# Patient Record
Sex: FEMALE | Race: WHITE | NOT HISPANIC OR LATINO | Employment: OTHER
[De-identification: names, ages, dates, MRNs, and addresses within clinical notes are randomized per-mention and may not be internally consistent; named-entity substitution may affect disease eponyms.]

---

## 2019-04-18 PROCEDURE — 88305 TISSUE EXAM BY PATHOLOGIST: CPT | Performed by: INTERNAL MEDICINE

## 2019-05-23 PROCEDURE — 82784 ASSAY IGA/IGD/IGG/IGM EACH: CPT | Performed by: INTERNAL MEDICINE

## 2019-07-09 PROBLEM — K58.0 IRRITABLE BOWEL SYNDROME WITH DIARRHEA: Status: ACTIVE | Noted: 2019-07-09

## 2021-08-23 PROBLEM — R73.09 ELEVATED GLUCOSE: Status: ACTIVE | Noted: 2021-08-23

## 2021-08-23 PROBLEM — Z28.21 COVID-19 VACCINATION REFUSED: Status: ACTIVE | Noted: 2021-08-23

## 2021-08-23 PROBLEM — Z28.21 PNEUMOCOCCAL VACCINE REFUSED: Status: ACTIVE | Noted: 2021-08-23

## 2021-08-23 PROBLEM — Z85.850 HISTORY OF THYROID CANCER: Status: ACTIVE | Noted: 2021-08-23

## 2022-01-04 ENCOUNTER — EXTERNAL RECORD (OUTPATIENT)
Dept: HEALTH INFORMATION MANAGEMENT | Facility: OTHER | Age: 67
End: 2022-01-04

## 2022-03-21 ENCOUNTER — HOSPITAL ENCOUNTER (EMERGENCY)
Facility: HOSPITAL | Age: 67
Discharge: HOME | End: 2022-03-21
Attending: EMERGENCY MEDICINE
Payer: MEDICARE

## 2022-03-21 ENCOUNTER — APPOINTMENT (OUTPATIENT)
Dept: GENERAL RADIOLOGY | Facility: HOSPITAL | Age: 67
End: 2022-03-21
Attending: EMERGENCY MEDICINE
Payer: MEDICARE

## 2022-03-21 VITALS
RESPIRATION RATE: 20 BRPM | DIASTOLIC BLOOD PRESSURE: 65 MMHG | OXYGEN SATURATION: 100 % | SYSTOLIC BLOOD PRESSURE: 128 MMHG | BODY MASS INDEX: 18 KG/M2 | HEART RATE: 63 BPM | WEIGHT: 115 LBS | TEMPERATURE: 98 F

## 2022-03-21 DIAGNOSIS — N76.0 ACUTE VAGINITIS: ICD-10-CM

## 2022-03-21 DIAGNOSIS — E86.0 DEHYDRATION: Primary | ICD-10-CM

## 2022-03-21 LAB
ALBUMIN SERPL-MCNC: 3.6 G/DL (ref 3.4–5)
ALP LIVER SERPL-CCNC: 49 U/L
ALT SERPL-CCNC: 31 U/L
ANION GAP SERPL CALC-SCNC: 6 MMOL/L (ref 0–18)
AST SERPL-CCNC: 24 U/L (ref 15–37)
BASOPHILS # BLD AUTO: 0.05 X10(3) UL (ref 0–0.2)
BASOPHILS NFR BLD AUTO: 0.8 %
BILIRUB DIRECT SERPL-MCNC: <0.1 MG/DL (ref 0–0.2)
BILIRUB SERPL-MCNC: 0.3 MG/DL (ref 0.1–2)
BILIRUB UR QL: NEGATIVE
BUN BLD-MCNC: 20 MG/DL (ref 7–18)
BUN/CREAT SERPL: 16.3 (ref 10–20)
CALCIUM BLD-MCNC: 8.5 MG/DL (ref 8.5–10.1)
CHLORIDE SERPL-SCNC: 105 MMOL/L (ref 98–112)
CO2 SERPL-SCNC: 26 MMOL/L (ref 21–32)
COLOR UR: YELLOW
CREAT BLD-MCNC: 1.23 MG/DL
DEPRECATED RDW RBC AUTO: 43 FL (ref 35.1–46.3)
EOSINOPHIL # BLD AUTO: 0.14 X10(3) UL (ref 0–0.7)
EOSINOPHIL NFR BLD AUTO: 2.2 %
ERYTHROCYTE [DISTWIDTH] IN BLOOD BY AUTOMATED COUNT: 12 % (ref 11–15)
GLUCOSE BLD-MCNC: 99 MG/DL (ref 70–99)
GLUCOSE UR-MCNC: NEGATIVE MG/DL
HCT VFR BLD AUTO: 39.1 %
HGB BLD-MCNC: 12.5 G/DL
HGB UR QL STRIP.AUTO: NEGATIVE
IMM GRANULOCYTES # BLD AUTO: 0.01 X10(3) UL (ref 0–1)
KETONES UR-MCNC: NEGATIVE MG/DL
LEUKOCYTE ESTERASE UR QL STRIP.AUTO: NEGATIVE
LYMPHOCYTES # BLD AUTO: 1.35 X10(3) UL (ref 1–4)
LYMPHOCYTES NFR BLD AUTO: 21.2 %
MCH RBC QN AUTO: 31.3 PG (ref 26–34)
MCHC RBC AUTO-ENTMCNC: 32 G/DL (ref 31–37)
MCV RBC AUTO: 97.8 FL
MONOCYTES # BLD AUTO: 0.47 X10(3) UL (ref 0.1–1)
MONOCYTES NFR BLD AUTO: 7.4 %
NEUTROPHILS # BLD AUTO: 4.36 X10 (3) UL (ref 1.5–7.7)
NEUTROPHILS # BLD AUTO: 4.36 X10(3) UL (ref 1.5–7.7)
NEUTROPHILS NFR BLD AUTO: 68.2 %
NITRITE UR QL STRIP.AUTO: NEGATIVE
OSMOLALITY SERPL CALC.SUM OF ELEC: 287 MOSM/KG (ref 275–295)
PH UR: 7 [PH] (ref 5–8)
PLATELET # BLD AUTO: 295 10(3)UL (ref 150–450)
POTASSIUM SERPL-SCNC: 4.2 MMOL/L (ref 3.5–5.1)
PROT SERPL-MCNC: 6.7 G/DL (ref 6.4–8.2)
PROT UR-MCNC: NEGATIVE MG/DL
RBC # BLD AUTO: 4 X10(6)UL
SODIUM SERPL-SCNC: 137 MMOL/L (ref 136–145)
SP GR UR STRIP: 1.02 (ref 1–1.03)
TSI SER-ACNC: 0.92 MIU/ML (ref 0.36–3.74)
UROBILINOGEN UR STRIP-ACNC: <2
VIT C UR-MCNC: 40 MG/DL
WBC # BLD AUTO: 6.4 X10(3) UL (ref 4–11)

## 2022-03-21 PROCEDURE — 84443 ASSAY THYROID STIM HORMONE: CPT | Performed by: EMERGENCY MEDICINE

## 2022-03-21 PROCEDURE — 96360 HYDRATION IV INFUSION INIT: CPT

## 2022-03-21 PROCEDURE — 71045 X-RAY EXAM CHEST 1 VIEW: CPT | Performed by: EMERGENCY MEDICINE

## 2022-03-21 PROCEDURE — 80048 BASIC METABOLIC PNL TOTAL CA: CPT | Performed by: EMERGENCY MEDICINE

## 2022-03-21 PROCEDURE — 80076 HEPATIC FUNCTION PANEL: CPT | Performed by: EMERGENCY MEDICINE

## 2022-03-21 PROCEDURE — 99284 EMERGENCY DEPT VISIT MOD MDM: CPT

## 2022-03-21 PROCEDURE — 85025 COMPLETE CBC W/AUTO DIFF WBC: CPT | Performed by: EMERGENCY MEDICINE

## 2022-03-21 PROCEDURE — 81001 URINALYSIS AUTO W/SCOPE: CPT | Performed by: EMERGENCY MEDICINE

## 2022-03-21 RX ORDER — SODIUM CHLORIDE 9 MG/ML
INJECTION, SOLUTION INTRAVENOUS CONTINUOUS
Status: DISCONTINUED | OUTPATIENT
Start: 2022-03-21 | End: 2022-03-21

## 2022-03-21 RX ORDER — FLUCONAZOLE 150 MG/1
150 TABLET ORAL
Qty: 2 TABLET | Refills: 0 | Status: SHIPPED | OUTPATIENT
Start: 2022-03-21 | End: 2022-03-29

## 2022-03-21 NOTE — ED QUICK NOTES
Pt states has been weak, chills, hot flashes since UTI. States finished antibiotic yesterday, continues to have symptoms.

## 2022-03-21 NOTE — ED INITIAL ASSESSMENT (HPI)
Pt to ED for continued weakness, labored breathing with activity, nausea. Recent UTI and finished bactrim last night. RR even and unlabored in triage. Referred to ED for continued symptoms.

## 2023-01-19 ENCOUNTER — EXTERNAL RECORD (OUTPATIENT)
Dept: HEALTH INFORMATION MANAGEMENT | Facility: OTHER | Age: 68
End: 2023-01-19

## 2023-04-23 PROBLEM — F41.9 ANXIETY: Status: ACTIVE | Noted: 2023-04-23

## 2023-04-23 PROBLEM — N71.9: Status: ACTIVE | Noted: 2023-04-23

## 2023-04-23 PROBLEM — K58.9 IBS (IRRITABLE BOWEL SYNDROME): Status: ACTIVE | Noted: 2023-04-23

## 2023-04-23 PROBLEM — N85.8 ATROPHIC ENDOMETRIUM: Status: ACTIVE | Noted: 2023-04-23

## 2023-04-23 PROBLEM — Z85.850 HISTORY OF THYROID CANCER: Status: ACTIVE | Noted: 2023-04-23

## 2023-05-02 ENCOUNTER — OFFICE VISIT (OUTPATIENT)
Dept: OBGYN | Age: 68
End: 2023-05-02

## 2023-05-02 ENCOUNTER — TELEPHONE (OUTPATIENT)
Dept: OBGYN | Age: 68
End: 2023-05-02

## 2023-05-02 VITALS
HEART RATE: 68 BPM | BODY MASS INDEX: 16.31 KG/M2 | HEIGHT: 68 IN | SYSTOLIC BLOOD PRESSURE: 114 MMHG | TEMPERATURE: 98.1 F | DIASTOLIC BLOOD PRESSURE: 73 MMHG | WEIGHT: 107.58 LBS | OXYGEN SATURATION: 99 %

## 2023-05-02 DIAGNOSIS — F41.9 ANXIETY: ICD-10-CM

## 2023-05-02 DIAGNOSIS — N85.8 ATROPHIC ENDOMETRIUM: Primary | ICD-10-CM

## 2023-05-02 PROCEDURE — 99214 OFFICE O/P EST MOD 30 MIN: CPT | Performed by: OBSTETRICS & GYNECOLOGY

## 2023-05-02 RX ORDER — LEVOTHYROXINE SODIUM 50 UG/1
TABLET ORAL
COMMUNITY
Start: 2023-04-11

## 2023-05-02 ASSESSMENT — PATIENT HEALTH QUESTIONNAIRE - PHQ9
CLINICAL INTERPRETATION OF PHQ2 SCORE: NO FURTHER SCREENING NEEDED
2. FEELING DOWN, DEPRESSED OR HOPELESS: NOT AT ALL
1. LITTLE INTEREST OR PLEASURE IN DOING THINGS: NOT AT ALL
SUM OF ALL RESPONSES TO PHQ9 QUESTIONS 1 AND 2: 0
SUM OF ALL RESPONSES TO PHQ9 QUESTIONS 1 AND 2: 0

## 2023-05-24 ENCOUNTER — TELEPHONE (OUTPATIENT)
Dept: OBGYN | Age: 68
End: 2023-05-24

## 2023-08-01 ENCOUNTER — OFFICE VISIT (OUTPATIENT)
Dept: OTOLARYNGOLOGY | Facility: CLINIC | Age: 68
End: 2023-08-01

## 2023-08-01 VITALS — WEIGHT: 113 LBS | BODY MASS INDEX: 17.74 KG/M2 | HEIGHT: 67 IN

## 2023-08-01 DIAGNOSIS — H61.23 CERUMEN DEBRIS ON TYMPANIC MEMBRANE OF BOTH EARS: Primary | ICD-10-CM

## 2023-08-01 PROCEDURE — 69210 REMOVE IMPACTED EAR WAX UNI: CPT | Performed by: SPECIALIST

## 2023-08-01 NOTE — PROGRESS NOTES
Ears = bilateral cerumen occlussions. Fully cleaned under microscope using instrumentation and suctioning. Normal tympanic membranes. Follow-up in 1 year's time, sooner if problems.

## 2024-02-23 ENCOUNTER — OFFICE VISIT (OUTPATIENT)
Dept: OTOLARYNGOLOGY | Facility: CLINIC | Age: 69
End: 2024-02-23

## 2024-02-23 VITALS — WEIGHT: 115 LBS | HEIGHT: 67 IN | BODY MASS INDEX: 18.05 KG/M2

## 2024-02-23 DIAGNOSIS — R60.0 FACIAL EDEMA: ICD-10-CM

## 2024-02-23 DIAGNOSIS — H92.01 REFERRED OTALGIA OF RIGHT EAR: Primary | ICD-10-CM

## 2024-02-23 PROCEDURE — 99213 OFFICE O/P EST LOW 20 MIN: CPT | Performed by: SPECIALIST

## 2024-02-23 RX ORDER — OMEPRAZOLE 40 MG/1
40 CAPSULE, DELAYED RELEASE ORAL DAILY
Qty: 30 CAPSULE | Refills: 2 | Status: SHIPPED | OUTPATIENT
Start: 2024-02-23

## 2024-02-23 RX ORDER — AMOXICILLIN AND CLAVULANATE POTASSIUM 875; 125 MG/1; MG/1
1 TABLET, FILM COATED ORAL EVERY 12 HOURS
Qty: 20 TABLET | Refills: 0 | Status: SHIPPED | OUTPATIENT
Start: 2024-02-23

## 2024-02-24 NOTE — PROGRESS NOTES
Leonela Faria is a 68 year old female.   Chief Complaint   Patient presents with    Ear Problem     Ear pain     HPI:   Patient with swelling of her right face.  Also right otalgia.  Has difficulty chewing.    Current Outpatient Medications   Medication Sig Dispense Refill    amoxicillin clavulanate 875-125 MG Oral Tab Take 1 tablet by mouth every 12 (twelve) hours. 20 tablet 0    Omeprazole 40 MG Oral Capsule Delayed Release Take 1 capsule (40 mg total) by mouth daily. Before a meal 30 capsule 2    hydrocortisone 2.5 % External Ointment Apply 1 Application  topically daily. 30 g 3    sulfamethoxazole-trimethoprim -160 MG Oral Tab per tablet Take 1 tablet by mouth 2 (two) times daily. 14 tablet 0    UNITHROID 50 MCG Oral Tab Take 1 tablet (50 mcg total) by mouth before breakfast. 90 tablet 0    PATIENT SUPPLIED MEDICATION orth molecular products - pyloricil BID      Multiple Vitamins-Minerals (MULTI-VITAMIN/MINERALS) Oral Tab Take 1 tablet by mouth daily.      Thiamine HCl (VITAMIN B-1) 50 MG Oral Tab Take 1 tablet (50 mg total) by mouth daily.      Vitamin C 500 MG Oral Tab Take 1 tablet (500 mg total) by mouth daily.      LUTEIN-ZEAXANTHIN OR Take 1 capsule by mouth every morning.      Bilberry, Vaccinium myrtillus, (BILBERRY OR) Take 1 capsule by mouth every morning.      BIOTIN OR Take 1 capsule by mouth every morning.      Cholecalciferol (VITAMIN D3) 1000 units Oral Cap Take 1 tablet by mouth 2 (two) times daily.      Coenzyme Q10 (COQ10) 30 MG Oral Cap Take 1 capsule by mouth 2 (two) times daily.      Calcium 500-125 MG-UNIT Oral Tab Take by mouth.      Boswellia Ivone (BOSWELLIA OR) Take 2 capsules by mouth every evening.      POTASSIUM OR Take 1 tablet by mouth every evening.        Past Medical History:   Diagnosis Date    Anxiety     IBS (irritable bowel syndrome)       Social History:  Social History     Socioeconomic History    Marital status:    Tobacco Use    Smoking status: Never     Smokeless tobacco: Never   Vaping Use    Vaping Use: Never used   Substance and Sexual Activity    Drug use: Never        REVIEW OF SYSTEMS:   GENERAL HEALTH: feels well otherwise  GENERAL : denies fever, chills, sweats, weight loss, weight gain  SKIN: denies any unusual skin lesions or rashes  RESPIRATORY: denies shortness of breath with exertion  NEURO: denies headaches    EXAM:   Ht 5' 7\" (1.702 m)   Wt 115 lb (52.2 kg)   BMI 18.01 kg/m²   System Details   Skin Inspection - Normal.   Constitutional Overall appearance - Normal.   Head/Face Facial features - Normal. Eyebrows - Normal. Skull - Normal.   Eyes Conjunctiva - Right: Normal, Left: Normal. Pupil - Right: Normal, Left: Normal.    Ears Inspection - Right: Normal, Left: Normal.   Canal - Right: Normal, Left: Normal.   TM - Right: Normal, Left: Normal.  No middle ear fluid either ear   Nasal External nose - Normal.   Nasal septum - Normal.  Turbinates - Normal.   Oral/Oropharynx Lips - Normal, Tonsils - Normal, Tongue - Normal.  Some swelling anterior to the right parotid gland and along the inferior border of the mandible.  No obvious dental infection noted.  Pain with palpation of the condylar head as well as biting on tongue blades on the right.   Neck Exam Inspection - Normal. Palpation - Normal. Parotid gland - Normal. Thyroid gland - Normal.   Lymph Detail Submental. Submandibular. Anterior cervical. Posterior cervical. Supraclavicular all without enlargement   Psychiatric Orientation - Oriented to time, place, person & situation. Appropriate mood and affect.   Neurological Memory - Normal. Cranial nerves - Cranial nerves II through XII grossly intact.     ASSESSMENT AND PLAN:   1. Referred otalgia of right ear  Negative otologic exam    2. Facial edema  Suspect dental origin.  Patient placed on a trial of Augmentin.  I asked her to see her dentist for possible dental films.  Patient to go to the emergency room should her symptoms worsen    The  patient indicates understanding of these issues and agrees to the plan.      Melinda Jaramillo MD  2/23/2024  10:17 PM

## 2024-02-24 NOTE — PATIENT INSTRUCTIONS
He had facial swelling along the inferior border of the mandible.  I believe this is dental in origin.  I believe your right otalgia is a referred pain as there was no evidence of ear infection on the day to your visit.  I placed you on Augmentin and asked you to see your dentist.  Go to the emergency room should your symptoms worsen.

## 2024-03-21 ENCOUNTER — TELEPHONE (OUTPATIENT)
Dept: OBGYN | Age: 69
End: 2024-03-21

## 2024-03-27 ENCOUNTER — TELEPHONE (OUTPATIENT)
Dept: OTOLARYNGOLOGY | Facility: CLINIC | Age: 69
End: 2024-03-27

## 2024-03-27 DIAGNOSIS — R22.1 NECK MASS: Primary | ICD-10-CM

## 2024-03-27 RX ORDER — AZITHROMYCIN 250 MG/1
TABLET, FILM COATED ORAL
Qty: 6 TABLET | Refills: 0 | Status: SHIPPED | OUTPATIENT
Start: 2024-03-27

## 2024-03-27 NOTE — TELEPHONE ENCOUNTER
Patient having symptoms and has an affected lump on her neck that is worsening. Patient has appointment scheduled tomorrow and asking if she can be seen today. Patient also indicates a fax was sent to the office from [facility]ates,   Please is not home right now, so request to call on mobile number [phone], thanks.

## 2024-03-27 NOTE — TELEPHONE ENCOUNTER
Patient on augmentin.  Had swelling over the thyroid.  Consider thyroglossal duct cyst.  Also intermittent swelling of the right parotid gland.

## 2024-03-27 NOTE — TELEPHONE ENCOUNTER
Spoke with  patient stated her thyroid Dr. So Massey mentioned she see Dr. Jaramillo for lump on throat. Patient is scheduled.   Called thyroid doctor office to have notes from today visit sent to our office. [phone]. Fax #[phone]. LMTCB and to fax over notes from visit.   Future Appointments   Date Time Provider Department Center   3/28/2024  5:20 PM Melinda Jaramillo MD [facility]   5/15/2024 10:30 AM Tavo José MD [facility]   7/30/2024  8:30 AM Melinda Jar[facility]illo MD [facility]

## 2024-03-28 ENCOUNTER — TELEPHONE (OUTPATIENT)
Dept: OTOLARYNGOLOGY | Facility: CLINIC | Age: 69
End: 2024-03-28

## 2024-03-28 ENCOUNTER — OFFICE VISIT (OUTPATIENT)
Dept: OTOLARYNGOLOGY | Facility: CLINIC | Age: 69
End: 2024-03-28

## 2024-03-28 ENCOUNTER — HOSPITAL ENCOUNTER (OUTPATIENT)
Dept: CT IMAGING | Facility: HOSPITAL | Age: 69
Discharge: HOME | End: 2024-03-28
Attending: SPECIALIST
Payer: MEDICARE

## 2024-03-28 VITALS — BODY MASS INDEX: 18.05 KG/M2 | WEIGHT: 115 LBS | HEIGHT: 67 IN

## 2024-03-28 DIAGNOSIS — Q89.2 THYROGLOSSAL DUCT CYST: ICD-10-CM

## 2024-03-28 DIAGNOSIS — R22.1 NECK MASS: Primary | ICD-10-CM

## 2024-03-28 DIAGNOSIS — R22.1 NECK MASS: ICD-10-CM

## 2024-03-28 DIAGNOSIS — E04.1 THYROID NODULE: ICD-10-CM

## 2024-03-28 DIAGNOSIS — Z85.850 HISTORY OF THYROID CANCER: ICD-10-CM

## 2024-03-28 LAB
CREAT BLD-MCNC: 1.5 MG/DL
EGFRCR SERPLBLD CKD-EPI 2021: 37 ML/MIN/1.73M2 (ref 60–?)

## 2024-03-28 PROCEDURE — 99214 OFFICE O/P EST MOD 30 MIN: CPT | Performed by: SPECIALIST

## 2024-03-28 PROCEDURE — 82565 ASSAY OF CREATININE: CPT

## 2024-03-28 PROCEDURE — 70491 CT SOFT TISSUE NECK W/DYE: CPT | Performed by: SPECIALIST

## 2024-03-28 RX ORDER — FLUCONAZOLE 100 MG/1
100 TABLET ORAL DAILY
Qty: 1 TABLET | Refills: 0 | Status: SHIPPED | OUTPATIENT
Start: 2024-03-28

## 2024-03-28 RX ORDER — CLINDAMYCIN HYDROCHLORIDE 300 MG/1
300 CAPSULE ORAL EVERY 8 HOURS
Qty: 30 CAPSULE | Refills: 0 | Status: SHIPPED | OUTPATIENT
Start: 2024-03-28 | End: 2024-04-07

## 2024-03-28 NOTE — TELEPHONE ENCOUNTER
Patient was advised that Ct of neck would cover the parotid gland. If additional scans are needed then it can be discussed during appointment today with Dr. Jaramillo. Patient verbalized understanding.

## 2024-03-28 NOTE — TELEPHONE ENCOUNTER
Patient called regarding how she only received referral for CT scan, would she also be having anything done for her Parotid gland?  Patient requested if nurse can callback? Patient as appointment for CT scan at 1 pm today at [facility].

## 2024-03-29 ENCOUNTER — TELEPHONE (OUTPATIENT)
Dept: OTOLARYNGOLOGY | Facility: CLINIC | Age: 69
End: 2024-03-29

## 2024-03-29 NOTE — PATIENT INSTRUCTIONS
You have an infected thyroglossal duct cyst.  I placed you on a trial of clindamycin.  I also sent you in a prescription of Diflucan if you have a vaginal yeast infection that helped by the Lotrimin cream.  Follow-up in 2 to 3 weeks time, sooner if problems.  We can consider excision once the infection is clear.

## 2024-03-29 NOTE — PROGRESS NOTES
Leonela Faria is a 68 year old female.   Chief Complaint   Patient presents with    Follow - Up     referred otalgia of right ear    Results     Discuss CT scan results     HPI:   Here for a checkup on her CT scan results.    Current Outpatient Medications   Medication Sig Dispense Refill    clindamycin 300 MG Oral Cap Take 1 capsule (300 mg total) by mouth every 8 (eight) hours for 10 days. 30 capsule 0    fluconazole 100 MG Oral Tab Take 1 tablet (100 mg total) by mouth daily. 1 tablet 0    azithromycin (ZITHROMAX Z-LILIANA) 250 MG Oral Tab Take 1 by oral route every day for 5 days. 2 tablets today. 6 tablet 0    amoxicillin clavulanate 875-125 MG Oral Tab Take 1 tablet by mouth every 12 (twelve) hours. 20 tablet 0    Omeprazole 40 MG Oral Capsule Delayed Release Take 1 capsule (40 mg total) by mouth daily. Before a meal 30 capsule 2    hydrocortisone 2.5 % External Ointment Apply 1 Application  topically daily. 30 g 3    sulfamethoxazole-trimethoprim -160 MG Oral Tab per tablet Take 1 tablet by mouth 2 (two) times daily. 14 tablet 0    UNITHROID 50 MCG Oral Tab Take 1 tablet (50 mcg total) by mouth before breakfast. 90 tablet 0    PATIENT SUPPLIED MEDICATION orth molecular products - pyloricil BID      Multiple Vitamins-Minerals (MULTI-VITAMIN/MINERALS) Oral Tab Take 1 tablet by mouth daily.      Thiamine HCl (VITAMIN B-1) 50 MG Oral Tab Take 1 tablet (50 mg total) by mouth daily.      Vitamin C 500 MG Oral Tab Take 1 tablet (500 mg total) by mouth daily.      LUTEIN-ZEAXANTHIN OR Take 1 capsule by mouth every morning.      Bilberry, Vaccinium myrtillus, (BILBERRY OR) Take 1 capsule by mouth every morning.      BIOTIN OR Take 1 capsule by mouth every morning.      Cholecalciferol (VITAMIN D3) 1000 units Oral Cap Take 1 tablet by mouth 2 (two) times daily.      Coenzyme Q10 (COQ10) 30 MG Oral Cap Take 1 capsule by mouth 2 (two) times daily.      Calcium 500-125 MG-UNIT Oral Tab Take by mouth.      Boswellia  Ivone (BOSWELLIA OR) Take 2 capsules by mouth every evening.      POTASSIUM OR Take 1 tablet by mouth every evening.        Past Medical History:   Diagnosis Date    Anxiety     IBS (irritable bowel syndrome)       Social History:  Social History     Socioeconomic History    Marital status:    Tobacco Use    Smoking status: Never    Smokeless tobacco: Never   Vaping Use    Vaping Use: Never used   Substance and Sexual Activity    Drug use: Never        REVIEW OF SYSTEMS:   GENERAL HEALTH: feels well otherwise  GENERAL : denies fever, chills, sweats, weight loss, weight gain  SKIN: denies any unusual skin lesions or rashes  RESPIRATORY: denies shortness of breath with exertion  NEURO: denies headaches    EXAM:   Ht 5' 7\" (1.702 m)   Wt 115 lb (52.2 kg)   BMI 18.01 kg/m²   System Details   Skin Inspection - Normal.   Constitutional Overall appearance - Normal.   Head/Face Facial features - Normal. Eyebrows - Normal. Skull - Normal.   Eyes Conjunctiva - Right: Normal, Left: Normal. Pupil - Right: Normal, Left: Normal.    Ears Inspection - Right: Normal, Left: Normal.   Canal - Right: Normal, Left: Normal.   TM - Right: Normal, Left: Normal.   Nasal External nose - Normal.   Nasal septum - Normal.  Turbinates - Normal.   Oral/Oropharynx Lips - Normal, Tonsils - Normal, Tongue - Normal    Neck Exam Inspection - Normal. Palpation - Normal. Parotid gland -slight fullness around the inferior aspect of the parotid gland clear drainage through the bilateral parotid ducts thyroid gland - Normal.  Roundish mass around the level of the hyoid in the midline.  Tender to palpation.   Lymph Detail Submental. Submandibular. Anterior cervical. Posterior cervical. Supraclavicular all without enlargement   Psychiatric Orientation - Oriented to time, place, person & situation. Appropriate mood and affect.   Neurological Memory - Normal. Cranial nerves - Cranial nerves II through XII grossly intact.     ASSESSMENT AND PLAN:    1. Neck mass  CT scan reviewed including actual images with patient and  most consistent with infected thyroglossal duct cyst.    2. Thyroglossal duct cyst  Patient placed on trial of clindamycin.  Also given a prescription of Diflucan should she have a worsening of her vaginal yeast infection.  Follow-up in 2 to 3 weeks time.  Will discuss excision further at that point in time.    3. History of thyroid cancer  Absent right thyroid.  Small nodule in left thyroid.    4. Thyroid nodule  Seen on CT being followed by .      The patient indicates understanding of these issues and agrees to the plan.      Melinda Jaramillo MD  3/28/2024  11:18 PM

## 2024-03-29 NOTE — TELEPHONE ENCOUNTER
Patient calling stating she would like the 3 page report from her CT scan yesterday faxed over to her doctor; Dr. Massey. Dr. Massey's fax number is [phone]. Phone number is [phone]. Please advise.

## 2024-04-02 ENCOUNTER — TELEPHONE (OUTPATIENT)
Dept: OTOLARYNGOLOGY | Facility: CLINIC | Age: 69
End: 2024-04-02

## 2024-04-02 NOTE — TELEPHONE ENCOUNTER
Per pt states thyroglossal duct cyst doesn't seem to be going down, seems larger, pt states she has continued to take antibiotics, requesting to speak to RN, unsure if she needs to see Dr. Jaramillo. Please call thank you.

## 2024-04-02 NOTE — TELEPHONE ENCOUNTER
Per patient, thyroglossal cyst seems to be larger, circled red area is extending, has been having chills over weekend but did not take temperature, is concerned whether she should be seen again, if antibiotics are working.    Dr. Jaramillo, please see message and advise.  Thank you.

## 2024-04-03 ENCOUNTER — TELEPHONE (OUTPATIENT)
Dept: OTOLARYNGOLOGY | Facility: CLINIC | Age: 69
End: 2024-04-03

## 2024-04-03 ENCOUNTER — OFFICE VISIT (OUTPATIENT)
Dept: OTOLARYNGOLOGY | Facility: CLINIC | Age: 69
End: 2024-04-03

## 2024-04-03 DIAGNOSIS — K14.8 THYROGLOSSAL DUCT INFECTION: Primary | ICD-10-CM

## 2024-04-03 PROCEDURE — 99213 OFFICE O/P EST LOW 20 MIN: CPT | Performed by: SPECIALIST

## 2024-04-03 RX ORDER — AZITHROMYCIN 250 MG/1
TABLET, FILM COATED ORAL
Qty: 6 TABLET | Refills: 0 | Status: SHIPPED | OUTPATIENT
Start: 2024-04-03

## 2024-04-03 NOTE — TELEPHONE ENCOUNTER
Patient calling would like to know if she will need to stop  taking previous antibiotic clindamycin 300 MG Oral Cap in order to start the new antibiotic  azithromycin (ZITHROMAX Z-LILIANA) 250 MG Oral Tab    Please advise

## 2024-04-04 NOTE — TELEPHONE ENCOUNTER
Contacted patient and per Dr. Jaramillo, patient is to stop clindamycin and to continue zithromycin as directed.  Per patient, had some slight drainage from cultured thyroglossal duct cyst, so she was able to press some additional drainage from cyst by gently pressing on area. Instructed patient to NOT attempt to squeeze any additional drainage from cyst, and continue to take zithromycin as directed.  Discussed above with Dr. Jaramillo and md concurred with information given. Verbalized understanding.  Will call patient with culture results once received.

## 2024-04-04 NOTE — PROGRESS NOTES
Leonela Faria is a 68 year old female.   Chief Complaint   Patient presents with    Follow - Up     Neck mass has more swelling      HPI:   Here has been on Augmentin then clindamycin.  Increased erythema over the infected cyst.    Current Outpatient Medications   Medication Sig Dispense Refill    azithromycin (ZITHROMAX Z-LILIANA) 250 MG Oral Tab Take 1 by oral route every day for 5 days. 2 tablets today. 6 tablet 0    clindamycin 300 MG Oral Cap Take 1 capsule (300 mg total) by mouth every 8 (eight) hours for 10 days. 30 capsule 0    fluconazole 100 MG Oral Tab Take 1 tablet (100 mg total) by mouth daily. 1 tablet 0    azithromycin (ZITHROMAX Z-LILIANA) 250 MG Oral Tab Take 1 by oral route every day for 5 days. 2 tablets today. 6 tablet 0    amoxicillin clavulanate 875-125 MG Oral Tab Take 1 tablet by mouth every 12 (twelve) hours. 20 tablet 0    Omeprazole 40 MG Oral Capsule Delayed Release Take 1 capsule (40 mg total) by mouth daily. Before a meal 30 capsule 2    hydrocortisone 2.5 % External Ointment Apply 1 Application  topically daily. 30 g 3    sulfamethoxazole-trimethoprim -160 MG Oral Tab per tablet Take 1 tablet by mouth 2 (two) times daily. 14 tablet 0    UNITHROID 50 MCG Oral Tab Take 1 tablet (50 mcg total) by mouth before breakfast. 90 tablet 0    PATIENT SUPPLIED MEDICATION orth molecular products - pyloricil BID      Multiple Vitamins-Minerals (MULTI-VITAMIN/MINERALS) Oral Tab Take 1 tablet by mouth daily.      Thiamine HCl (VITAMIN B-1) 50 MG Oral Tab Take 1 tablet (50 mg total) by mouth daily.      Vitamin C 500 MG Oral Tab Take 1 tablet (500 mg total) by mouth daily.      LUTEIN-ZEAXANTHIN OR Take 1 capsule by mouth every morning.      Bilberry, Vaccinium myrtillus, (BILBERRY OR) Take 1 capsule by mouth every morning.      BIOTIN OR Take 1 capsule by mouth every morning.      Cholecalciferol (VITAMIN D3) 1000 units Oral Cap Take 1 tablet by mouth 2 (two) times daily.      Coenzyme Q10 (COQ10) 30  MG Oral Cap Take 1 capsule by mouth 2 (two) times daily.      Calcium 500-125 MG-UNIT Oral Tab Take by mouth.      Boswellia Ivone (BOSWELLIA OR) Take 2 capsules by mouth every evening.      POTASSIUM OR Take 1 tablet by mouth every evening.        Past Medical History:   Diagnosis Date    Anxiety     IBS (irritable bowel syndrome)       Social History:  Social History     Socioeconomic History    Marital status:    Tobacco Use    Smoking status: Never    Smokeless tobacco: Never   Vaping Use    Vaping Use: Never used   Substance and Sexual Activity    Drug use: Never        REVIEW OF SYSTEMS:   GENERAL HEALTH: feels well otherwise  GENERAL : denies fever, chills, sweats, weight loss, weight gain  SKIN: denies any unusual skin lesions or rashes  RESPIRATORY: denies shortness of breath with exertion  NEURO: denies headaches    EXAM:   There were no vitals taken for this visit.  System Details   Skin Inspection - Normal.   Constitutional Overall appearance - Normal.   Head/Face Facial features - Normal. Eyebrows - Normal. Skull - Normal.   Eyes Conjunctiva - Right: Normal, Left: Normal. Pupil - Right: Normal, Left: Normal.    Ears Inspection - Right: Normal, Left: Normal.      Nasal External nose - Normal.      Oral/Oropharynx Lips - Normal   Neck Exam Inspection - Normal. Palpation - Normal. Parotid gland - Normal. Thyroid gland - Normal.  Inflamed thyroglossal duct cyst.  Consent was obtained.  Area was cleaned with rubbing alcohol.  Area was anesthetized with 1% lidocaine with 100,000 epinephrine.  An 18-gauge needle was used to obtain some purulence.  This was sent for culture.   Lymph Detail Submental. Submandibular. Anterior cervical. Posterior cervical. Supraclavicular all without enlargement   Psychiatric Orientation - Oriented to time, place, person & situation. Appropriate mood and affect.   Neurological Memory - Normal. Cranial nerves - Cranial nerves II through XII grossly intact.     ASSESSMENT  AND PLAN:   1. Thyroglossal duct infection  Culture sent.  Patient placed on Zithromycin.  I will of course notify you her of all results.  - Aerobic Bacterial Culture; Future  - Anaerobic Culture; Future  - Anaerobic Culture  - Aerobic Bacterial Culture      The patient indicates understanding of these issues and agrees to the plan.      Melinda Jaramillo MD  4/3/2024  10:08 PM

## 2024-04-04 NOTE — PATIENT INSTRUCTIONS
Aspirate was taken of your infected thyroglossal duct cyst.  I placed you on Zithromycin until the culture comes back.  I will of course call you with the culture results.

## 2024-04-07 ENCOUNTER — TELEPHONE (OUTPATIENT)
Dept: OTOLARYNGOLOGY | Facility: CLINIC | Age: 69
End: 2024-04-07

## 2024-04-07 RX ORDER — AMOXICILLIN AND CLAVULANATE POTASSIUM 875; 125 MG/1; MG/1
1 TABLET, FILM COATED ORAL EVERY 12 HOURS
Qty: 20 TABLET | Refills: 0 | Status: SHIPPED | OUTPATIENT
Start: 2024-04-07

## 2024-04-08 ENCOUNTER — TELEPHONE (OUTPATIENT)
Dept: OTOLARYNGOLOGY | Facility: CLINIC | Age: 69
End: 2024-04-08

## 2024-04-08 ENCOUNTER — OFFICE VISIT (OUTPATIENT)
Dept: OTOLARYNGOLOGY | Facility: CLINIC | Age: 69
End: 2024-04-08

## 2024-04-08 VITALS — HEIGHT: 67 IN | BODY MASS INDEX: 18.05 KG/M2 | WEIGHT: 115 LBS

## 2024-04-08 DIAGNOSIS — K14.8 THYROGLOSSAL DUCT INFECTION: Primary | ICD-10-CM

## 2024-04-08 PROCEDURE — 99213 OFFICE O/P EST LOW 20 MIN: CPT | Performed by: SPECIALIST

## 2024-04-08 NOTE — TELEPHONE ENCOUNTER
I returned patient's call and advised her to use water and a mild soap to clean that cyst on her neck, Dr. Jaramillo agreed that would be fine.  Leonela agreeable to that plan of care and will call us if it gets worse.

## 2024-04-08 NOTE — PATIENT INSTRUCTIONS
The infection in your thyroglossal duct cyst looks improved.  The culture grew a Proteus mirabilis sensitive to the Augmentin.  As of now the anaerobic culture is negative.  Continue to dress the area with gauze until the drainage stops.  Okay to gently clean the area with soap and water.  Follow-up in about 1 week's time, sooner if problems.

## 2024-04-08 NOTE — PROGRESS NOTES
Leonela Faria is a 68 year old female.   Chief Complaint   Patient presents with    Follow - Up     F/up thyroglossal duct infection  Pt reports increased drainage and discomfort     HPI:   Here for follow-up on her infected thyroglossal duct cyst    Current Outpatient Medications   Medication Sig Dispense Refill    amoxicillin clavulanate 875-125 MG Oral Tab Take 1 tablet by mouth every 12 (twelve) hours. 20 tablet 0    azithromycin (ZITHROMAX Z-LILIANA) 250 MG Oral Tab Take 1 by oral route every day for 5 days. 2 tablets today. 6 tablet 0    fluconazole 100 MG Oral Tab Take 1 tablet (100 mg total) by mouth daily. 1 tablet 0    azithromycin (ZITHROMAX Z-LILIANA) 250 MG Oral Tab Take 1 by oral route every day for 5 days. 2 tablets today. 6 tablet 0    amoxicillin clavulanate 875-125 MG Oral Tab Take 1 tablet by mouth every 12 (twelve) hours. 20 tablet 0    Omeprazole 40 MG Oral Capsule Delayed Release Take 1 capsule (40 mg total) by mouth daily. Before a meal 30 capsule 2    hydrocortisone 2.5 % External Ointment Apply 1 Application  topically daily. 30 g 3    sulfamethoxazole-trimethoprim -160 MG Oral Tab per tablet Take 1 tablet by mouth 2 (two) times daily. 14 tablet 0    UNITHROID 50 MCG Oral Tab Take 1 tablet (50 mcg total) by mouth before breakfast. 90 tablet 0    PATIENT SUPPLIED MEDICATION orth molecular products - pyloricil BID      Multiple Vitamins-Minerals (MULTI-VITAMIN/MINERALS) Oral Tab Take 1 tablet by mouth daily.      Thiamine HCl (VITAMIN B-1) 50 MG Oral Tab Take 1 tablet (50 mg total) by mouth daily.      Vitamin C 500 MG Oral Tab Take 1 tablet (500 mg total) by mouth daily.      LUTEIN-ZEAXANTHIN OR Take 1 capsule by mouth every morning.      Bilberry, Vaccinium myrtillus, (BILBERRY OR) Take 1 capsule by mouth every morning.      BIOTIN OR Take 1 capsule by mouth every morning.      Cholecalciferol (VITAMIN D3) 1000 units Oral Cap Take 1 tablet by mouth 2 (two) times daily.      Coenzyme Q10  (COQ10) 30 MG Oral Cap Take 1 capsule by mouth 2 (two) times daily.      Calcium 500-125 MG-UNIT Oral Tab Take by mouth.      Boswellia Ivone (BOSWELLIA OR) Take 2 capsules by mouth every evening.      POTASSIUM OR Take 1 tablet by mouth every evening.        Past Medical History:   Diagnosis Date    Anxiety     IBS (irritable bowel syndrome)       Social History:  Social History     Socioeconomic History    Marital status:    Tobacco Use    Smoking status: Never    Smokeless tobacco: Never   Vaping Use    Vaping Use: Never used   Substance and Sexual Activity    Drug use: Never        REVIEW OF SYSTEMS:   GENERAL HEALTH: feels well otherwise  GENERAL : denies fever, chills, sweats, weight loss, weight gain  SKIN: denies any unusual skin lesions or rashes  RESPIRATORY: denies shortness of breath with exertion  NEURO: denies headaches    EXAM:   Ht 5' 7\" (1.702 m)   Wt 115 lb (52.2 kg)   BMI 18.01 kg/m²   System Details   Skin Inspection - Normal.   Constitutional Overall appearance - Normal.   Head/Face Facial features - Normal. Eyebrows - Normal. Skull - Normal.   Eyes Conjunctiva - Right: Normal, Left: Normal. Pupil - Right: Normal, Left: Normal.    Ears Inspection - Right: Normal, Left: Normal.          Oral/Oropharynx Lips - Normal,    Neck Exam Inspection - Normal. Palpation - Normal. Parotid gland - Normal. Thyroid gland - Normal.  Area cleaned with povidone iodine.  There is a defect over the cyst which is draining a thick material.  Markedly less erythema than before.   Lymph Detail Submental. Submandibular. Anterior cervical. Posterior cervical. Supraclavicular all without enlargement   Psychiatric Orientation - Oriented to time, place, person & situation. Appropriate mood and affect.   Neurological Memory - Normal. Cranial nerves - Cranial nerves II through XII grossly intact.     ASSESSMENT AND PLAN:   Infected thyroglossal duct cyst.  Culture growing Proteus mirabilis.  Sensitive to Augmentin.   Patient changed early yesterday.  Much less erythema than before.  Continue to dress over the area.  Follow-up in 1 week's time, sooner if problems.  Patient will get this electively excised once the acute infection is resolved.    The patient indicates understanding of these issues and agrees to the plan.      Melinda Jaramillo MD  4/8/2024  1:00 PM

## 2024-04-08 NOTE — TELEPHONE ENCOUNTER
Pt has a question:  Per pt   Dr Jaramillo said to keep the draining area clean of her cyst should she be cleaning it with something special?Please advise

## 2024-04-12 ENCOUNTER — TELEPHONE (OUTPATIENT)
Dept: OBGYN | Age: 69
End: 2024-04-12

## 2024-04-12 ENCOUNTER — TELEPHONE (OUTPATIENT)
Dept: OTOLARYNGOLOGY | Facility: CLINIC | Age: 69
End: 2024-04-12

## 2024-04-12 RX ORDER — AMOXICILLIN AND CLAVULANATE POTASSIUM 875; 125 MG/1; MG/1
1 TABLET, FILM COATED ORAL EVERY 12 HOURS
Qty: 14 TABLET | Refills: 0 | Status: SHIPPED | OUTPATIENT
Start: 2024-04-12 | End: 2024-06-10

## 2024-04-12 NOTE — TELEPHONE ENCOUNTER
Pt is being treated for a thyroglossal duct cyst.  Appointment on 4-8-24.  Cyst has continued to drain.  Yesterday at dinner time excessive draining.  Will pt need more antibiotics.    Please call

## 2024-04-18 ENCOUNTER — OFFICE VISIT (OUTPATIENT)
Dept: OTOLARYNGOLOGY | Facility: CLINIC | Age: 69
End: 2024-04-18

## 2024-04-18 VITALS — WEIGHT: 115 LBS | HEIGHT: 67 IN | BODY MASS INDEX: 18.05 KG/M2

## 2024-04-18 DIAGNOSIS — L72.3 SEBACEOUS CYST: ICD-10-CM

## 2024-04-18 DIAGNOSIS — K14.8 THYROGLOSSAL DUCT INFECTION: Primary | ICD-10-CM

## 2024-04-18 PROCEDURE — 99213 OFFICE O/P EST LOW 20 MIN: CPT | Performed by: SPECIALIST

## 2024-04-19 ENCOUNTER — TELEPHONE (OUTPATIENT)
Dept: OTOLARYNGOLOGY | Facility: CLINIC | Age: 69
End: 2024-04-19

## 2024-04-19 DIAGNOSIS — L72.3 SEBACEOUS CYST: ICD-10-CM

## 2024-04-19 DIAGNOSIS — Q89.2 THYROGLOSSAL DUCT CYST: Primary | ICD-10-CM

## 2024-04-19 NOTE — TELEPHONE ENCOUNTER
Patient scheduled for THYROGLOSSAL DUCT CYST EXCISION  EXCISION AND CLOSURE OF NECK SEBACEOUS CYST on 5/20/24 at [facility] with Dr. Jaramillo, Dr. Covarrubias to assist.

## 2024-04-19 NOTE — PROGRESS NOTES
Patient scheduled for THYROGLOSSAL DUCT CYST EXCISION  EXCISION AND CLOSURE OF NECK SEBACEOUS CYST on 5/20/24 at [place] with Dr. Jaramillo, Dr. Covarrubias to assist.

## 2024-04-19 NOTE — PROGRESS NOTES
Leonela Faria is a 68 year old female.   Chief Complaint   Patient presents with    Follow - Up     thyroglossal duct infection     HPI:   Patient here has been on Augmentin for an infected thyroglossal duct cyst.    Current Outpatient Medications   Medication Sig Dispense Refill    amoxicillin clavulanate 875-125 MG Oral Tab Take 1 tablet by mouth every 12 (twelve) hours. 14 tablet 0    amoxicillin clavulanate 875-125 MG Oral Tab Take 1 tablet by mouth every 12 (twelve) hours. 20 tablet 0    azithromycin (ZITHROMAX Z-LILIANA) 250 MG Oral Tab Take 1 by oral route every day for 5 days. 2 tablets today. 6 tablet 0    fluconazole 100 MG Oral Tab Take 1 tablet (100 mg total) by mouth daily. 1 tablet 0    azithromycin (ZITHROMAX Z-LILIANA) 250 MG Oral Tab Take 1 by oral route every day for 5 days. 2 tablets today. 6 tablet 0    amoxicillin clavulanate 875-125 MG Oral Tab Take 1 tablet by mouth every 12 (twelve) hours. 20 tablet 0    Omeprazole 40 MG Oral Capsule Delayed Release Take 1 capsule (40 mg total) by mouth daily. Before a meal 30 capsule 2    hydrocortisone 2.5 % External Ointment Apply 1 Application  topically daily. 30 g 3    sulfamethoxazole-trimethoprim -160 MG Oral Tab per tablet Take 1 tablet by mouth 2 (two) times daily. 14 tablet 0    UNITHROID 50 MCG Oral Tab Take 1 tablet (50 mcg total) by mouth before breakfast. 90 tablet 0    PATIENT SUPPLIED MEDICATION orth molecular products - pyloricil BID      Multiple Vitamins-Minerals (MULTI-VITAMIN/MINERALS) Oral Tab Take 1 tablet by mouth daily.      Thiamine HCl (VITAMIN B-1) 50 MG Oral Tab Take 1 tablet (50 mg total) by mouth daily.      Vitamin C 500 MG Oral Tab Take 1 tablet (500 mg total) by mouth daily.      LUTEIN-ZEAXANTHIN OR Take 1 capsule by mouth every morning.      Bilberry, Vaccinium myrtillus, (BILBERRY OR) Take 1 capsule by mouth every morning.      BIOTIN OR Take 1 capsule by mouth every morning.      Cholecalciferol (VITAMIN D3) 1000 units  Oral Cap Take 1 tablet by mouth 2 (two) times daily.      Coenzyme Q10 (COQ10) 30 MG Oral Cap Take 1 capsule by mouth 2 (two) times daily.      Calcium 500-125 MG-UNIT Oral Tab Take by mouth.      Boswellia Ivone (BOSWELLIA OR) Take 2 capsules by mouth every evening.      POTASSIUM OR Take 1 tablet by mouth every evening.        Past Medical History:    Anxiety    IBS (irritable bowel syndrome)      Social History:  Social History     Socioeconomic History    Marital status:    Tobacco Use    Smoking status: Never    Smokeless tobacco: Never   Vaping Use    Vaping status: Never Used   Substance and Sexual Activity    Drug use: Never        REVIEW OF SYSTEMS:   GENERAL HEALTH: feels well otherwise  GENERAL : denies fever, chills, sweats, weight loss, weight gain  SKIN: denies any unusual skin lesions or rashes  RESPIRATORY: denies shortness of breath with exertion  NEURO: denies headaches    EXAM:   Ht 5' 7\" (1.702 m)   Wt 115 lb (52.2 kg)   BMI 18.01 kg/m²   System Details   Skin Inspection - Normal.   Constitutional Overall appearance - Normal.   Head/Face Facial features - Normal. Eyebrows - Normal. Skull - Normal.   Eyes Conjunctiva - Right: Normal, Left: Normal. Pupil - Right: Normal, Left: Normal.    Ears Inspection - Right: Normal, Left: Normal.      Nasal External nose - Normal.      Oral/Oropharynx Lips - Normal   Neck Exam Inspection - Normal. Palpation - Normal. Parotid gland - Normal. Thyroid gland - Normal.  Much less erythema over the infected area.  Subcutaneous right sebaceous cyst in a similar area.   Lymph Detail Submental. Submandibular. Anterior cervical. Posterior cervical. Supraclavicular all without enlargement   Psychiatric Orientation - Oriented to time, place, person & situation. Appropriate mood and affect.   Neurological Memory - Normal. Cranial nerves - Cranial nerves II through XII grossly intact.     ASSESSMENT AND PLAN:   1. Thyroglossal duct infection  Regularly improved  infection after the Augmentin.  Patient to finish the course of this.  We discussed the surgery in depth.  Patient to get this scheduled within the next 2 to 6 weeks time.    2. Sebaceous cyst  This is in the skin overlying the area.  Can be removed at the same time.      The patient indicates understanding of these issues and agrees to the plan.      Melinda Jaramillo MD  4/18/2024  9:03 PM

## 2024-04-19 NOTE — PATIENT INSTRUCTIONS
There was a marked improvement in your thyroglossal duct cyst infection.  Finish your Augmentin.  A surgical procedure to remove the thyroglossal duct cyst can be done in the next 2 to 6 weeks time.  Delmis from our office will call to schedule.  You have an incidental sebaceous cyst in the same area which can be removed at the same time of the procedure.

## 2024-04-23 ENCOUNTER — TELEPHONE (OUTPATIENT)
Dept: OTOLARYNGOLOGY | Facility: CLINIC | Age: 69
End: 2024-04-23

## 2024-04-23 ENCOUNTER — PATIENT MESSAGE (OUTPATIENT)
Dept: OTOLARYNGOLOGY | Facility: CLINIC | Age: 69
End: 2024-04-23

## 2024-04-23 NOTE — TELEPHONE ENCOUNTER
Pt is being seen for thyroglossal duct cyst.  Today 4-23-24 will be finishing the antibiotic.  Area is red around other cyst.  Not draining. Should pt continue taking antibiotic. Send to [facility].   Please call

## 2024-04-23 NOTE — TELEPHONE ENCOUNTER
Contacted patient, and per Dr. Jaramillo, think it looks ok now.  MD would keep an eye on it.  If it gets any worse, then MD will refill. Patient verbalized understanding

## 2024-04-23 NOTE — TELEPHONE ENCOUNTER
Contacted patient again to upload photos for Dr. Jaramillo to view.  Photos are now visible via [employer].    Dr. Jaramillo, photos are now uploaded for your viewing in [employer].  Thank you.

## 2024-04-23 NOTE — TELEPHONE ENCOUNTER
Patient is calling to confirm if pictures requested of cyst were uploaded to [facility]. Please advise.

## 2024-04-23 NOTE — TELEPHONE ENCOUNTER
Contacted patient, has mychart pending so will complete that now, was sent a new link by .  Once completed, explained how to take picture and upload to [employer] for dr. Jaramillo to view.    Dr. Jaramillo, awaiting patient to finish pending [employer], then will upload photo to [employer].   Pt will let us know if any issues.

## 2024-04-23 NOTE — TELEPHONE ENCOUNTER
Contacted patient, per patient area around the thyroglossal cyst is red, no swollen, no drainage,no fever.  Would like to know if she should continue antibiotics, today is last day of her current course of antibiotics.    Dr. Jaramillo, please see message and advise.  Thank you.

## 2024-04-24 NOTE — TELEPHONE ENCOUNTER
From: Leonela Faria  To: Melinda Jaramillo  Sent: 4/23/2024 4:11 PM CDT  Subject: Photos of cyst    These are my 3 photos of my Thyro cyst.

## 2024-05-05 ENCOUNTER — TELEPHONE (OUTPATIENT)
Dept: OTOLARYNGOLOGY | Facility: CLINIC | Age: 69
End: 2024-05-05

## 2024-05-05 RX ORDER — AMOXICILLIN AND CLAVULANATE POTASSIUM 875; 125 MG/1; MG/1
1 TABLET, FILM COATED ORAL EVERY 12 HOURS
Qty: 20 TABLET | Refills: 0 | Status: SHIPPED | OUTPATIENT
Start: 2024-05-05

## 2024-05-06 ENCOUNTER — TELEPHONE (OUTPATIENT)
Dept: OTOLARYNGOLOGY | Facility: CLINIC | Age: 69
End: 2024-05-06

## 2024-05-06 NOTE — TELEPHONE ENCOUNTER
Patient states they began to feel a soreness in the cyst and there is a slit on the cyst where it was healing. Sunday 05.05.24 the patient called and Dr. Covarrubias called her back. He prescribed her Amoxicillin again. Dr. Covarrubias stated per patient for her to see Dr. Jaramillo as soon as possible for her to see the cyst.

## 2024-05-06 NOTE — TELEPHONE ENCOUNTER
Patient stated she has been having chills and pain/soreness from cyst. Patient does see two small slits on cyst like a paper cut. Dr. Covarrubias did prescribe antibiotic for patient. Patient was scheduled for appointment per her request. Patient wanted me to inform Dr. Jaramillo.

## 2024-05-09 ENCOUNTER — LAB ENCOUNTER (OUTPATIENT)
Dept: LAB | Facility: HOSPITAL | Age: 69
End: 2024-05-09
Attending: SPECIALIST
Payer: MEDICARE

## 2024-05-09 ENCOUNTER — OFFICE VISIT (OUTPATIENT)
Dept: OTOLARYNGOLOGY | Facility: CLINIC | Age: 69
End: 2024-05-09

## 2024-05-09 VITALS — BODY MASS INDEX: 18.05 KG/M2 | WEIGHT: 115 LBS | HEIGHT: 67 IN

## 2024-05-09 DIAGNOSIS — L72.3 SEBACEOUS CYST: ICD-10-CM

## 2024-05-09 DIAGNOSIS — K14.8 THYROGLOSSAL DUCT INFECTION: ICD-10-CM

## 2024-05-09 DIAGNOSIS — R06.09 DYSPNEA ON EXERTION: Primary | ICD-10-CM

## 2024-05-09 DIAGNOSIS — R06.09 DYSPNEA ON EXERTION: ICD-10-CM

## 2024-05-09 LAB
ATRIAL RATE: 58 BPM
P AXIS: 81 DEGREES
P-R INTERVAL: 206 MS
Q-T INTERVAL: 406 MS
QRS DURATION: 70 MS
QTC CALCULATION (BEZET): 398 MS
R AXIS: -14 DEGREES
T AXIS: 26 DEGREES
VENTRICULAR RATE: 58 BPM

## 2024-05-09 PROCEDURE — 93005 ELECTROCARDIOGRAM TRACING: CPT

## 2024-05-09 PROCEDURE — 99213 OFFICE O/P EST LOW 20 MIN: CPT | Performed by: SPECIALIST

## 2024-05-09 PROCEDURE — 93010 ELECTROCARDIOGRAM REPORT: CPT | Performed by: INTERNAL MEDICINE

## 2024-05-10 NOTE — PROGRESS NOTES
Leonela Faria is a 68 year old female.   Chief Complaint   Patient presents with    Cyst     F/u thyroglossal duct cyst infection , prescribed antibiotic by oncall      HPI:   Patient originally planned for surgery 11 days from now.  Is on Augmentin for an infection in the thyroglossal duct cyst.  Reports increasing dyspnea with exertion.    Current Outpatient Medications   Medication Sig Dispense Refill    amoxicillin clavulanate 875-125 MG Oral Tab Take 1 tablet by mouth every 12 (twelve) hours. 20 tablet 0    UNITHROID 50 MCG Oral Tab Take 1 tablet (50 mcg total) by mouth before breakfast. 90 tablet 0    PATIENT SUPPLIED MEDICATION orth molecular products - pyloricil BID      Multiple Vitamins-Minerals (MULTI-VITAMIN/MINERALS) Oral Tab Take 1 tablet by mouth daily.      Thiamine HCl (VITAMIN B-1) 50 MG Oral Tab Take 1 tablet (50 mg total) by mouth daily.      Vitamin C 500 MG Oral Tab Take 1 tablet (500 mg total) by mouth daily.      LUTEIN-ZEAXANTHIN OR Take 1 capsule by mouth every morning.      Bilberry, Vaccinium myrtillus, (BILBERRY OR) Take 1 capsule by mouth every morning.      BIOTIN OR Take 1 capsule by mouth every morning.      Cholecalciferol (VITAMIN D3) 1000 units Oral Cap Take 1 tablet by mouth 2 (two) times daily.      Coenzyme Q10 (COQ10) 30 MG Oral Cap Take 1 capsule by mouth 2 (two) times daily.      Calcium 500-125 MG-UNIT Oral Tab Take by mouth.      Boswellia Ivone (BOSWELLIA OR) Take 2 capsules by mouth every evening.      amoxicillin clavulanate 875-125 MG Oral Tab Take 1 tablet by mouth every 12 (twelve) hours. (Patient not taking: Reported on 5/9/2024) 20 tablet 0    amoxicillin clavulanate 875-125 MG Oral Tab Take 1 tablet by mouth every 12 (twelve) hours. (Patient not taking: Reported on 5/9/2024) 14 tablet 0    amoxicillin clavulanate 875-125 MG Oral Tab Take 1 tablet by mouth every 12 (twelve) hours. (Patient not taking: Reported on 5/9/2024) 20 tablet 0    azithromycin  (ZITHROMAX Z-LILIANA) 250 MG Oral Tab Take 1 by oral route every day for 5 days. 2 tablets today. (Patient not taking: Reported on 5/9/2024) 6 tablet 0    fluconazole 100 MG Oral Tab Take 1 tablet (100 mg total) by mouth daily. (Patient not taking: Reported on 5/9/2024) 1 tablet 0    azithromycin (ZITHROMAX Z-LILIANA) 250 MG Oral Tab Take 1 by oral route every day for 5 days. 2 tablets today. (Patient not taking: Reported on 5/9/2024) 6 tablet 0    Omeprazole 40 MG Oral Capsule Delayed Release Take 1 capsule (40 mg total) by mouth daily. Before a meal (Patient not taking: Reported on 5/9/2024) 30 capsule 2    hydrocortisone 2.5 % External Ointment Apply 1 Application  topically daily. (Patient not taking: Reported on 5/9/2024) 30 g 3    sulfamethoxazole-trimethoprim -160 MG Oral Tab per tablet Take 1 tablet by mouth 2 (two) times daily. (Patient not taking: Reported on 5/9/2024) 14 tablet 0    POTASSIUM OR Take 1 tablet by mouth every evening. (Patient not taking: Reported on 5/9/2024)        Past Medical History:    Anxiety    IBS (irritable bowel syndrome)      Social History:  Social History     Socioeconomic History    Marital status:    Tobacco Use    Smoking status: Never    Smokeless tobacco: Never   Vaping Use    Vaping status: Never Used   Substance and Sexual Activity    Drug use: Never        REVIEW OF SYSTEMS:   GENERAL HEALTH: feels well otherwise  GENERAL : denies fever, chills, sweats, weight loss, weight gain  SKIN: denies any unusual skin lesions or rashes  RESPIRATORY: denies shortness of breath with exertion  NEURO: denies headaches    EXAM:   Ht 5' 7\" (1.702 m)   Wt 115 lb (52.2 kg)   BMI 18.01 kg/m²   System Details   Skin Inspection - Normal.   Constitutional Overall appearance - Normal.   Head/Face Facial features - Normal. Eyebrows - Normal. Skull - Normal.   Eyes Conjunctiva - Right: Normal, Left: Normal. Pupil - Right: Normal, Left: Normal.    Ears Inspection - Right: Normal, Left:  Normal.      Nasal External nose - Normal.      Oral/Oropharynx Lips - Normal   Neck Exam Inspection - Normal. Palpation - Normal. Parotid gland - Normal. Thyroid gland -well-healed thyroidectomy scar.  No evidence infection over the thyroglossal duct area on the date of the visit.  Sebaceous cyst overlying the area of the thyroglossal duct cyst.  This can be excised along with the thyroglossal duct cyst.   Lungs Clear to auscultation   Heart Regular rate and rhythm no murmurs   Lymph Detail Submental. Submandibular. Anterior cervical. Posterior cervical. Supraclavicular all without enlargement   Psychiatric Orientation - Oriented to time, place, person & situation. Appropriate mood and affect.   Neurological Memory - Normal. Cranial nerves - Cranial nerves II through XII grossly intact.     ASSESSMENT AND PLAN:   1. Dyspnea on exertion  EKG ordered as patient reports a worsening of dyspnea with exertion.  Will also ask for medical clearance prior to the procedure.  - EKG 12 Lead; Future    2. Thyroglossal duct infection  Finish Augmentin.    3. Sebaceous cyst  Sebaceous cyst overlying the area of the thyroglossal duct cyst      The patient indicates understanding of these issues and agrees to the plan.      Melinda Jaramillo MD  5/9/2024  9:51 PM

## 2024-05-10 NOTE — TELEPHONE ENCOUNTER
Pt called.  Spoke to Dr. Jaramillo. Advised pt will need clearance for surgery.  Would you recommend Dr. De La Cruz.  Can be seen this week.  Dr. Armas appointment available on 6-11-24. Will wait if you want pt to see Dr. Armas.    Please call

## 2024-05-10 NOTE — PATIENT INSTRUCTIONS
Finish your Augmentin.  An EKG was ordered as you are having worsening of your dyspnea with exertion.  Please also obtain a medical clearance for your procedure.  I would delay the surgery until the above have been addressed.

## 2024-05-10 NOTE — TELEPHONE ENCOUNTER
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  Not seen 8/21 EKG.  Patient is aware surgery on hold until medical clearance is obtained.

## 2024-05-10 NOTE — TELEPHONE ENCOUNTER
Contacted patient and per Dr. Jaramillo, Dr. De La Cruz is good.  Please go ahead and schedule appointment.  Patient is unsure how to proceed, may favor Dr. Armas due to reviews, wants to \"sleep\" on it and will call office back as may have to readjust her surgery date depending on md chosen.

## 2024-05-10 NOTE — PROGRESS NOTES
Please review.  Please evaluate and clear patient prior to surgery for excision of her thyroglossal duct cyst.

## 2024-05-13 ENCOUNTER — TELEPHONE (OUTPATIENT)
Dept: OTOLARYNGOLOGY | Facility: CLINIC | Age: 69
End: 2024-05-13

## 2024-05-13 DIAGNOSIS — Q89.2 THYROGLOSSAL DUCT CYST: Primary | ICD-10-CM

## 2024-05-13 NOTE — TELEPHONE ENCOUNTER
Reschedule surgery to 6/17/24 due to current infection and pending cardiology work up. Patient is  scheduled to see Dr. Aayush Armas on 6/11/24.     Faxed cardiology clearance request to Dr. Armas's office.

## 2024-05-13 NOTE — PROGRESS NOTES
Patient had an appointment with Dr Oh and he saw 4m pulmonary nodule and hardened fecal matter in the intestines. He would like to discuss this with Dr Carrillo. Please call to discuss.    [phone]   Reschedule surgery to 6/17/24.

## 2024-05-14 ENCOUNTER — TELEPHONE (OUTPATIENT)
Dept: OTOLARYNGOLOGY | Facility: CLINIC | Age: 69
End: 2024-05-14

## 2024-05-14 NOTE — TELEPHONE ENCOUNTER
Patient calling to update primary doctor for our records. Patient would like a call back to do so. Please advise.

## 2024-05-14 NOTE — TELEPHONE ENCOUNTER
Contacted patient, requested primary MD to be changed to Dr. Izzy Burden which was done.  States that cyst area is improving and has 2 doses of antibiotics left. Will finish antibiotics and call office back if there are any changes.

## 2024-05-24 ENCOUNTER — TELEPHONE (OUTPATIENT)
Dept: OTOLARYNGOLOGY | Facility: CLINIC | Age: 69
End: 2024-05-24

## 2024-05-24 NOTE — TELEPHONE ENCOUNTER
Per patient wants to provide an update stating there is some red around the white cyst. Per patient the slit is not completely healed yet and now and then she still feels a twinge of soreness in the cyst area. Per patient there is no draining. Thank you

## 2024-05-25 NOTE — TELEPHONE ENCOUNTER
She can always be seen if she feels it is getting reinfected.  The main thing she needs to do is get medical clearance so we can proceed with the surgery.  Where is she in this process.

## 2024-05-28 NOTE — TELEPHONE ENCOUNTER
Received a return call to this office, patient requested appt for evaluation of cyst healing prior to her scheduled surgery.   Is scheduled to see Dr. Armas on 6/11/2024 for cardiology clearance, trying to get an earlier appt.  Future Appointments   Date Time Provider Department Center   6/4/2024  8:20 AM Melinda Jaramillo MD [facility]   7/30/2024  8:30 AM Melinda Jaramillo MD [facility]   11/6/2024 10:45 AM [facility]   '    NELIA Thompson

## 2024-06-04 ENCOUNTER — OFFICE VISIT (OUTPATIENT)
Dept: OTOLARYNGOLOGY | Facility: CLINIC | Age: 69
End: 2024-06-04

## 2024-06-04 VITALS — BODY MASS INDEX: 18.05 KG/M2 | HEIGHT: 67 IN | WEIGHT: 115 LBS

## 2024-06-04 DIAGNOSIS — L72.3 SEBACEOUS CYST: ICD-10-CM

## 2024-06-04 DIAGNOSIS — Q89.2 THYROGLOSSAL DUCT CYST: Primary | ICD-10-CM

## 2024-06-04 PROCEDURE — 99213 OFFICE O/P EST LOW 20 MIN: CPT | Performed by: SPECIALIST

## 2024-06-05 NOTE — PATIENT INSTRUCTIONS
No infection of your thyroglossal duct cyst on the date of your visit that required any treatment.  Please notify me of your cardiac clearance if it becomes available so that we can proceed with your surgery on 6/17/2024.

## 2024-06-05 NOTE — PROGRESS NOTES
Leonela Faria is a 68 year old female.   Chief Complaint   Patient presents with    Follow - Up     dyspnea on exertion     HPI:   Here has a question about whether her thyrolossal duct cyst is infected.    Current Outpatient Medications   Medication Sig Dispense Refill    azithromycin (ZITHROMAX Z-LILIANA) 250 MG Oral Tab Take 1 by oral route every day for 5 days. 2 tablets today. 6 tablet 0    UNITHROID 50 MCG Oral Tab Take 1 tablet (50 mcg total) by mouth before breakfast. 90 tablet 0    PATIENT SUPPLIED MEDICATION orth molecular products - pyloricil BID      Multiple Vitamins-Minerals (MULTI-VITAMIN/MINERALS) Oral Tab Take 1 tablet by mouth daily.      Thiamine HCl (VITAMIN B-1) 50 MG Oral Tab Take 1 tablet (50 mg total) by mouth daily.      Vitamin C 500 MG Oral Tab Take 1 tablet (500 mg total) by mouth daily.      LUTEIN-ZEAXANTHIN OR Take 1 capsule by mouth every morning.      Bilberry, Vaccinium myrtillus, (BILBERRY OR) Take 1 capsule by mouth every morning.      BIOTIN OR Take 1 capsule by mouth every morning.      Cholecalciferol (VITAMIN D3) 1000 units Oral Cap Take 1 tablet by mouth 2 (two) times daily.      Coenzyme Q10 (COQ10) 30 MG Oral Cap Take 1 capsule by mouth 2 (two) times daily.      Calcium 500-125 MG-UNIT Oral Tab Take by mouth.      Boswellia Ivone (BOSWELLIA OR) Take 2 capsules by mouth every evening.      amoxicillin clavulanate 875-125 MG Oral Tab Take 1 tablet by mouth every 12 (twelve) hours. (Patient not taking: Reported on 5/9/2024) 20 tablet 0    amoxicillin clavulanate 875-125 MG Oral Tab Take 1 tablet by mouth every 12 (twelve) hours. (Patient not taking: Reported on 5/9/2024) 14 tablet 0    amoxicillin clavulanate 875-125 MG Oral Tab Take 1 tablet by mouth every 12 (twelve) hours. (Patient not taking: Reported on 5/9/2024) 20 tablet 0    fluconazole 100 MG Oral Tab Take 1 tablet (100 mg total) by mouth daily. (Patient not taking: Reported on 5/9/2024) 1 tablet 0    azithromycin  (ZITHROMAX Z-LILIANA) 250 MG Oral Tab Take 1 by oral route every day for 5 days. 2 tablets today. (Patient not taking: Reported on 5/9/2024) 6 tablet 0    amoxicillin clavulanate 875-125 MG Oral Tab Take 1 tablet by mouth every 12 (twelve) hours. (Patient not taking: Reported on 6/4/2024) 20 tablet 0    Omeprazole 40 MG Oral Capsule Delayed Release Take 1 capsule (40 mg total) by mouth daily. Before a meal (Patient not taking: Reported on 5/9/2024) 30 capsule 2    hydrocortisone 2.5 % External Ointment Apply 1 Application  topically daily. (Patient not taking: Reported on 5/9/2024) 30 g 3    sulfamethoxazole-trimethoprim -160 MG Oral Tab per tablet Take 1 tablet by mouth 2 (two) times daily. (Patient not taking: Reported on 5/9/2024) 14 tablet 0    POTASSIUM OR Take 1 tablet by mouth every evening. (Patient not taking: Reported on 5/9/2024)        Past Medical History:    Anxiety    IBS (irritable bowel syndrome)      Social History:  Social History     Socioeconomic History    Marital status:    Tobacco Use    Smoking status: Never    Smokeless tobacco: Never   Vaping Use    Vaping status: Never Used   Substance and Sexual Activity    Drug use: Never        REVIEW OF SYSTEMS:   GENERAL HEALTH: feels well otherwise  GENERAL : denies fever, chills, sweats, weight loss, weight gain  SKIN: denies any unusual skin lesions or rashes  RESPIRATORY: denies shortness of breath with exertion  NEURO: denies headaches    EXAM:   Ht 5' 7\" (1.702 m)   Wt 115 lb (52.2 kg)   BMI 18.01 kg/m²   System Details   Skin Inspection - Normal.   Constitutional Overall appearance - Normal.   Head/Face Facial features - Normal. Eyebrows - Normal. Skull - Normal.   Eyes Conjunctiva - Right: Normal, Left: Normal. Pupil - Right: Normal, Left: Normal.    Ears Inspection - Right: Normal, Left: Normal.   Canal - Right: Normal, Left: Normal.   TM - Right: Normal, Left: Normal.   Nasal External nose - Normal.   Nasal septum -  Normal.  Turbinates - Normal.   Oral/Oropharynx Lips - Normal, Tonsils - Normal, Tongue - Normal    Neck Exam Inspection - Normal. Palpation - Normal. Parotid gland - Normal. Thyroid gland -scar from prior thyroidectomy.  Sebaceous cyst seen in the midline of the neck.  Area of prior drainage site well-healed.  No to minimal erythema.   Lymph Detail Submental. Submandibular. Anterior cervical. Posterior cervical. Supraclavicular all without enlargement   Psychiatric Orientation - Oriented to time, place, person & situation. Appropriate mood and affect.   Neurological Memory - Normal. Cranial nerves - Cranial nerves II through XII grossly intact.     ASSESSMENT AND PLAN:   1. Thyroglossal duct cyst  With known thyroglossal duct cyst.  She is waiting to get a cardiac clearance to get this excised on 6/17/2024.    2. Sebaceous cyst  Overlying thyroglossal duct cyst.  No antibiotics needed at this point in time.  Await cardiac clearance.      The patient indicates understanding of these issues and agrees to the plan.      Melinda Jaramillo MD  6/4/2024  8:22 PM

## 2024-06-10 ENCOUNTER — TELEPHONE (OUTPATIENT)
Dept: OTOLARYNGOLOGY | Facility: CLINIC | Age: 69
End: 2024-06-10

## 2024-06-10 NOTE — TELEPHONE ENCOUNTER
Patient calling to state they got their results for the echocardiogram and have gained clearance. Patient state her doctor will be faxing the results over. Please advise.

## 2024-06-11 NOTE — TELEPHONE ENCOUNTER
Confirmed surgery date of 6/17/24 with patient and answered additional questions. Patient will be receiving a call on Friday with time of arrival.

## 2024-06-13 ENCOUNTER — TELEPHONE (OUTPATIENT)
Dept: OTOLARYNGOLOGY | Facility: CLINIC | Age: 69
End: 2024-06-13

## 2024-06-13 NOTE — TELEPHONE ENCOUNTER
Dr. Jaramillo, do you have a preference for a stool softener for patients to take when taking pain medications post surgery? Please advise.

## 2024-06-13 NOTE — TELEPHONE ENCOUNTER
Called Leonela back, Dr. Jaramillo recommends a very gentle laxative like Correctol, Dulcolax for Women, Womens Gentle, just look for something that says Mild or Gentle.  Leonela wants to be prepared just in case following her surgery and having to take pain medication.

## 2024-06-13 NOTE — TELEPHONE ENCOUNTER
Per patient was told she would have pain medication prescribed right after surgery. Patient states she knows theres constipation when taking pain medication, asking what stool softeners Dr. Jaramillo recommends. Please call than you.

## 2024-06-14 ENCOUNTER — TELEPHONE (OUTPATIENT)
Dept: OTOLARYNGOLOGY | Facility: CLINIC | Age: 69
End: 2024-06-14

## 2024-06-14 NOTE — TELEPHONE ENCOUNTER
Patient calling has questions to ask to the nurse   Per patient stating last night when showering she felt the cyst sore and also today morning felt same way.Patient would like to know if is still ok to have surgery?Please advise

## 2024-06-14 NOTE — DISCHARGE INSTRUCTIONS
HOME INSTRUCTIONS  Keep incision dry and clean  Keep dressing on the neck for 48 hours.  After the dressing comes off apply triple antibiotic ointment to the incision twice daily.  Prescriptions Keflex, Zofran, and tramadol.  Okay to also take aspirin along with the tramadol.  No aspirin Advil Aleve ibuprofen or Motrin.  Liquid diet only for 48 hours this can be either clear or full liquids.  Soft diet after that no sharp or crunchy foods for 1 week.  Follow-up in my office in 1 week's time for suture removal.  Can only take a shower if the water does not hit your neck.  If not, I would take a bath and just wash with a washcloth above the shoulder area but not the incision.    AMBSURG HOME CARE INSTRUCTIONS: POST-OP ANESTHESIA  The medication that you received for sedation or general anesthesia can last up to 24 hours. Your judgment and reflexes may be altered, even if you feel like your normal self.      We Recommend:   Do not drive any motor vehicle or bicycle   Avoid mowing the lawn, playing sports, or working with power tools/applicances (power saws, electric knives or mixers)   That you have someone stay with you on your first night home   Do not drink alcohol or take sleeping pills or tranquilizers   Do not sign legal documents within 24 hours of your procedure   If you had a nerve block for your surgery, take extra care not to put any pressure on your arm or hand for 24 hours    It is normal:  For you to have a sore throat if you had a breathing tube during surgery (while you were asleep!). The sore throat should get better within 48 hours. You can gargle with warm salt water (1/2 tsp in 4 oz warm water) or use a throat lozenge for comfort  To feel muscle aches or soreness especially in the abdomen, chest or neck. The achy feeling should go away in the next 24 hours  To feel weak, sleepy or \"wiped out\". Your should start feeling better in the next 24 hours.   To experience mild discomforts such as sore lip  or tongue, headache, cramps, gas pains or a bloated feeling in your abdomen.   To experience mild back pain or soreness for a day or two if you had spinal or epidural anesthesia.   If you had laparoscopic surgery, to feel shoulder pain or discomfort on the day of surgery.   For some patients to have nausea after surgery/anesthesia    If you feel nausea or experience vomiting:   Try to move around less.   Eat less than usual or drink only liquids until the next morning   Nausea should resolve in about 24 hours    If you have a problem when you are at home:    Call your surgeons office   Discharge Instructions: After Your Surgery  You’ve just had surgery. During surgery, you were given medicine called anesthesia to keep you relaxed and free of pain. After surgery, you may have some pain or nausea. This is common. Here are some tips for feeling better and getting well after surgery.   Going home  Your healthcare provider will show you how to take care of yourself when you go home. They'll also answer your questions. Have an adult family member or friend drive you home. For the first 24 hours after your surgery:   Don't drive or use heavy equipment.  Don't make important decisions or sign legal papers.  Take medicines as directed.  Don't drink alcohol.  Have someone stay with you, if needed. They can watch for problems and help keep you safe.  Be sure to go to all follow-up visits with your healthcare provider. And rest after your surgery for as long as your provider tells you to.   Coping with pain  If you have pain after surgery, pain medicine will help you feel better. Take it as directed, before pain becomes severe. Also, ask your healthcare provider or pharmacist about other ways to control pain. This might be with heat, ice, or relaxation. And follow any other instructions your surgeon or nurse gives you.      Stay on schedule with your medicine.     Tips for taking pain medicine  To get the best relief possible,  remember these points:   Pain medicines can upset your stomach. Taking them with a little food may help.  Most pain relievers taken by mouth need at least 20 to 30 minutes to start to work.  Don't wait till your pain becomes severe before you take your medicine. Try to time your medicine so that you can take it before starting an activity. This might be before you get dressed, go for a walk, or sit down for dinner.  Constipation is a common side effect of some pain medicines. Call your healthcare provider before taking any medicines such as laxatives or stool softeners to help ease constipation. Also ask if you should skip any foods. Drinking lots of fluids and eating foods such as fruits and vegetables that are high in fiber can also help. Remember, don't take laxatives unless your surgeon has prescribed them.  Drinking alcohol and taking pain medicine can cause dizziness and slow your breathing. It can even be deadly. Don't drink alcohol while taking pain medicine.  Pain medicine can make you react more slowly to things. Don't drive or run machinery while taking pain medicine.  Your healthcare provider may tell you to take acetaminophen to help ease your pain. Ask them how much you're supposed to take each day. Acetaminophen or other pain relievers may interact with your prescription medicines or other over-the-counter (OTC) medicines. Some prescription medicines have acetaminophen and other ingredients in them. Using both prescription and OTC acetaminophen for pain can cause you to accidentally overdose. Read the labels on your OTC medicines with care. This will help you to clearly know the list of ingredients, how much to take, and any warnings. It may also help you not take too much acetaminophen. If you have questions or don't understand the information, ask your pharmacist or healthcare provider to explain it to you before you take the OTC medicine.   Managing nausea  Some people have an upset stomach  (nausea) after surgery. This is often because of anesthesia, pain, or pain medicine, less movement of food in the stomach, or the stress of surgery. These tips will help you handle nausea and eat healthy foods as you get better. If you were on a special food plan before surgery, ask your healthcare provider if you should follow it while you get better. Check with your provider on how your eating should progress. It may depend on the surgery you had. These general tips may help:   Don't push yourself to eat. Your body will tell you when to eat and how much.  Start off with clear liquids and soup. They're easier to digest.  Next try semi-solid foods as you feel ready. These include mashed potatoes, applesauce, and gelatin.  Slowly move to solid foods. Don’t eat fatty, rich, or spicy foods at first.  Don't force yourself to have 3 large meals a day. Instead eat smaller amounts more often.  Take pain medicines with a small amount of solid food, such as crackers or toast. This helps prevent nausea.  When to call your healthcare provider  Call your healthcare provider right away if you have any of these:   You still have too much pain, or the pain gets worse, after taking the medicine. The medicine may not be strong enough. Or there may be a complication from the surgery.  You feel too sleepy, dizzy, or groggy. The medicine may be too strong.  Side effects such as nausea or vomiting. Your healthcare provider may advise taking other medicines to .  Skin changes such as rash, itching, or hives. This may mean you have an allergic reaction. Your provider may advise taking other medicines.  The incision looks different (for instance, part of it opens up).  Bleeding or fluid leaking from the incision site, and weren't told to expect that.  Fever of 100.4°F (38°C) or higher, or as directed by your provider.  Call 911  Call 911 right away if you have:   Trouble breathing  Facial swelling    If you have obstructive sleep apnea    You were given anesthesia medicine during surgery to keep you comfortable and free of pain. After surgery, you may have more apnea spells because of this medicine and other medicines you were given. The spells may last longer than normal.    At home:  Keep using the continuous positive airway pressure (CPAP) device when you sleep. Unless your healthcare provider tells you not to, use it when you sleep, day or night. CPAP is a common device used to treat obstructive sleep apnea.  Talk with your provider before taking any pain medicine, muscle relaxants, or sedatives. Your provider will tell you about the possible dangers of taking these medicines.  Contact your provider if your sleeping changes a lot even when taking medicines as directed.  [employer] last reviewed this educational content on 10/1/2021  © 4917-4182 [employer]. All rights reserved. This information is not intended as a substitute for professional medical care. Always follow your healthcare professional's instructions.

## 2024-06-14 NOTE — TELEPHONE ENCOUNTER
Leonela Thompson called to let you know that the cyst slit was sore last night and again this morning, it is also a little red, she wants to know if this would cancel her surgery or can she move forward with surgery? Please advise. Sx is for 6/17/24

## 2024-06-16 NOTE — H&P
[facility]  part of [facility]    History and Physical    Leonela Faria Patient Status:  Hospital Outpatient Surgery    10/12/1955 MRN [id number]   Location [facility] OPERATING ROOM Attending Melinda Jaramillo MD   Hosp Day # 0 PCP Izzy Burden MD     Date:  2024  Date of Admission:  (Not on file)    History provided by:patient  HPI:   No chief complaint on file.    HPI  Patient with an infected thyroglossal duct cyst and an overlying sebaceous cyst     History     Past Medical History:    Anxiety    Disorder of thyroid    History of stomach ulcers    IBS (irritable bowel syndrome)    Migraines    after surgery migraines    PONV (postoperative nausea and vomiting)     Past Surgical History:   Procedure Laterality Date    Cholecystectomy      Colonoscopy  ,     Egd  2019    mild erosive gastritis    Upper gi endoscopy,exam       History reviewed. No pertinent family history.  Social History:  Social History     Socioeconomic History    Marital status:    Tobacco Use    Smoking status: Never    Smokeless tobacco: Never   Vaping Use    Vaping status: Never Used   Substance and Sexual Activity    Alcohol use: Never    Drug use: Never     Allergies/Medications:   Allergies:   Allergies   Allergen Reactions    Compazine [Prochlorperazine] OTHER (SEE COMMENTS)     Cramping, neck spasm     Gentamycin [Gentamicin] HIVES    Gluten Meal HIVES    Corn Oil OTHER (SEE COMMENTS)     Mucus production     Soybean Allergy OTHER (SEE COMMENTS)     Weakness     Vancomycin UNKNOWN     Doesn't want to take it     Medications: unithroid, multivitamins, vitamin C, lutein, bilberry, conQ10, calcium, boswellia    Review of Systems:   Review of Systems  Neck mass and cyst  Physical Exam:   Vital Signs:  Height 5' 7\" (1.702 m), weight 115 lb (52.2 kg).  Physical Exam  Ears: normal  Nose: normal  Mouth: normal  Neck: fullness from thyroglossal duct cyst and sebaceous cyst  Lungs:  clear  Heart: regular rate and rhythm.  No murmurs     Cervical Papanicolaou to be done in MD's office    Results:     Lab Results   Component Value Date    WBC 5.44 03/25/2022    HGB 13.4 03/25/2022    HCT 41.6 03/25/2022     03/25/2022    CREATSERUM 0.94 (H) 03/25/2022    BUN 22.2 (H) 03/25/2022     03/25/2022    K 4.75 03/25/2022     03/25/2022    CO2 25.4 03/25/2022     03/25/2022    CA 9.7 03/25/2022    ALB 3.6 03/21/2022    ALKPHO 49 (L) 03/21/2022    BILT 0.3 03/21/2022    TP 6.7 03/21/2022    AST 24 03/21/2022    ALT 31 03/21/2022    T4F 1.66 08/06/2021    TSH 0.915 03/21/2022     05/23/2019     CT neck:   CONCLUSION:      A peripherally enhancing 1.5 cm anterior midline neck lesion at the level of the strap muscles with mild surrounding fat stranding.  This lesion is favored to reflect a thyroglossal duct cyst and the fat stranding may reflect a superimposed infection,   however recommend clinical correlation.         Assessment/Plan:   Impression: thyroglossal duct cyst and sebaceous cyst  Plan: excision of thyroglossal duct and sebaceous cyst.     Cardiology clearance obtained from Dr. Armas.  Jackson to be at a low cardiac risk for surgery.        Melinda Jaramillo MD  6/16/2024

## 2024-06-17 ENCOUNTER — HOSPITAL ENCOUNTER (OUTPATIENT)
Facility: HOSPITAL | Age: 69
Discharge: HOME | End: 2024-06-18
Attending: SPECIALIST | Admitting: SPECIALIST

## 2024-06-17 ENCOUNTER — TELEPHONE (OUTPATIENT)
Dept: OTOLARYNGOLOGY | Facility: CLINIC | Age: 69
End: 2024-06-17

## 2024-06-17 ENCOUNTER — ANESTHESIA EVENT (OUTPATIENT)
Dept: SURGERY | Facility: HOSPITAL | Age: 69
End: 2024-06-17
Payer: MEDICARE

## 2024-06-17 ENCOUNTER — ANESTHESIA (OUTPATIENT)
Dept: SURGERY | Facility: HOSPITAL | Age: 69
End: 2024-06-17
Payer: MEDICARE

## 2024-06-17 DIAGNOSIS — Q89.2 THYROGLOSSAL DUCT CYST: Primary | ICD-10-CM

## 2024-06-17 DIAGNOSIS — L72.3 SEBACEOUS CYST: ICD-10-CM

## 2024-06-17 PROCEDURE — 0WB60ZZ EXCISION OF NECK, OPEN APPROACH: ICD-10-PCS | Performed by: SPECIALIST

## 2024-06-17 PROCEDURE — 60280 REMOVE THYROID DUCT LESION: CPT | Performed by: SPECIALIST

## 2024-06-17 PROCEDURE — 60280 REMOVE THYROID DUCT LESION: CPT | Performed by: OTOLARYNGOLOGY

## 2024-06-17 RX ORDER — DEXAMETHASONE SODIUM PHOSPHATE 4 MG/ML
VIAL (ML) INJECTION AS NEEDED
Status: DISCONTINUED | OUTPATIENT
Start: 2024-06-17 | End: 2024-06-17 | Stop reason: SURG

## 2024-06-17 RX ORDER — MORPHINE SULFATE 4 MG/ML
2 INJECTION, SOLUTION INTRAMUSCULAR; INTRAVENOUS EVERY 10 MIN PRN
Status: DISCONTINUED | OUTPATIENT
Start: 2024-06-17 | End: 2024-06-17 | Stop reason: HOSPADM

## 2024-06-17 RX ORDER — NALOXONE HYDROCHLORIDE 0.4 MG/ML
0.08 INJECTION, SOLUTION INTRAMUSCULAR; INTRAVENOUS; SUBCUTANEOUS AS NEEDED
Status: DISCONTINUED | OUTPATIENT
Start: 2024-06-17 | End: 2024-06-17 | Stop reason: HOSPADM

## 2024-06-17 RX ORDER — ONDANSETRON 4 MG/1
4 TABLET, FILM COATED ORAL EVERY 8 HOURS PRN
Qty: 6 TABLET | Refills: 0 | Status: SHIPPED | OUTPATIENT
Start: 2024-06-17

## 2024-06-17 RX ORDER — MORPHINE SULFATE 4 MG/ML
4 INJECTION, SOLUTION INTRAMUSCULAR; INTRAVENOUS EVERY 10 MIN PRN
Status: DISCONTINUED | OUTPATIENT
Start: 2024-06-17 | End: 2024-06-17 | Stop reason: HOSPADM

## 2024-06-17 RX ORDER — SCOLOPAMINE TRANSDERMAL SYSTEM 1 MG/1
1 PATCH, EXTENDED RELEASE TRANSDERMAL ONCE
Status: DISCONTINUED | OUTPATIENT
Start: 2024-06-17 | End: 2024-06-18

## 2024-06-17 RX ORDER — ACETAMINOPHEN 160 MG/5ML
650 SOLUTION ORAL EVERY 6 HOURS PRN
Status: DISCONTINUED | OUTPATIENT
Start: 2024-06-17 | End: 2024-06-18

## 2024-06-17 RX ORDER — MORPHINE SULFATE 10 MG/ML
6 INJECTION, SOLUTION INTRAMUSCULAR; INTRAVENOUS EVERY 10 MIN PRN
Status: DISCONTINUED | OUTPATIENT
Start: 2024-06-17 | End: 2024-06-17 | Stop reason: HOSPADM

## 2024-06-17 RX ORDER — METOCLOPRAMIDE HYDROCHLORIDE 5 MG/ML
10 INJECTION INTRAMUSCULAR; INTRAVENOUS EVERY 8 HOURS PRN
Status: DISCONTINUED | OUTPATIENT
Start: 2024-06-17 | End: 2024-06-18

## 2024-06-17 RX ORDER — SODIUM CHLORIDE 9 MG/ML
INJECTION, SOLUTION INTRAVENOUS CONTINUOUS
Status: DISCONTINUED | OUTPATIENT
Start: 2024-06-17 | End: 2024-06-18

## 2024-06-17 RX ORDER — TRAMADOL HYDROCHLORIDE 50 MG/1
TABLET ORAL EVERY 4 HOURS PRN
Qty: 20 TABLET | Refills: 0 | Status: SHIPPED | OUTPATIENT
Start: 2024-06-17 | End: 2024-06-18

## 2024-06-17 RX ORDER — DEXTROSE MONOHYDRATE AND SODIUM CHLORIDE 5; .45 G/100ML; G/100ML
INJECTION, SOLUTION INTRAVENOUS CONTINUOUS
Status: DISCONTINUED | OUTPATIENT
Start: 2024-06-17 | End: 2024-06-18

## 2024-06-17 RX ORDER — ONDANSETRON 2 MG/ML
4 INJECTION INTRAMUSCULAR; INTRAVENOUS EVERY 6 HOURS PRN
Status: DISCONTINUED | OUTPATIENT
Start: 2024-06-17 | End: 2024-06-18

## 2024-06-17 RX ORDER — EPHEDRINE SULFATE 50 MG/ML
INJECTION, SOLUTION INTRAVENOUS AS NEEDED
Status: DISCONTINUED | OUTPATIENT
Start: 2024-06-17 | End: 2024-06-17 | Stop reason: SURG

## 2024-06-17 RX ORDER — MORPHINE SULFATE 2 MG/ML
2 INJECTION, SOLUTION INTRAMUSCULAR; INTRAVENOUS EVERY 2 HOUR PRN
Status: DISCONTINUED | OUTPATIENT
Start: 2024-06-17 | End: 2024-06-18

## 2024-06-17 RX ORDER — ONDANSETRON 2 MG/ML
4 INJECTION INTRAMUSCULAR; INTRAVENOUS ONCE
Status: DISCONTINUED | OUTPATIENT
Start: 2024-06-17 | End: 2024-06-18

## 2024-06-17 RX ORDER — HYDROMORPHONE HYDROCHLORIDE 1 MG/ML
0.4 INJECTION, SOLUTION INTRAMUSCULAR; INTRAVENOUS; SUBCUTANEOUS EVERY 5 MIN PRN
Status: DISCONTINUED | OUTPATIENT
Start: 2024-06-17 | End: 2024-06-17 | Stop reason: HOSPADM

## 2024-06-17 RX ORDER — HYDROMORPHONE HYDROCHLORIDE 1 MG/ML
0.2 INJECTION, SOLUTION INTRAMUSCULAR; INTRAVENOUS; SUBCUTANEOUS EVERY 5 MIN PRN
Status: DISCONTINUED | OUTPATIENT
Start: 2024-06-17 | End: 2024-06-17 | Stop reason: HOSPADM

## 2024-06-17 RX ORDER — ONDANSETRON 2 MG/ML
4 INJECTION INTRAMUSCULAR; INTRAVENOUS EVERY 6 HOURS PRN
Status: DISCONTINUED | OUTPATIENT
Start: 2024-06-17 | End: 2024-06-17

## 2024-06-17 RX ORDER — ONDANSETRON 2 MG/ML
INJECTION INTRAMUSCULAR; INTRAVENOUS AS NEEDED
Status: DISCONTINUED | OUTPATIENT
Start: 2024-06-17 | End: 2024-06-17 | Stop reason: SURG

## 2024-06-17 RX ORDER — ACETAMINOPHEN 500 MG
1000 TABLET ORAL ONCE
Status: COMPLETED | OUTPATIENT
Start: 2024-06-17 | End: 2024-06-17

## 2024-06-17 RX ORDER — ONDANSETRON 2 MG/ML
4 INJECTION INTRAMUSCULAR; INTRAVENOUS ONCE
Status: COMPLETED | OUTPATIENT
Start: 2024-06-17 | End: 2024-06-17

## 2024-06-17 RX ORDER — CEPHALEXIN 500 MG/1
500 CAPSULE ORAL EVERY 8 HOURS
Qty: 21 CAPSULE | Refills: 0 | Status: SHIPPED | OUTPATIENT
Start: 2024-06-17

## 2024-06-17 RX ORDER — HYDROMORPHONE HYDROCHLORIDE 1 MG/ML
0.6 INJECTION, SOLUTION INTRAMUSCULAR; INTRAVENOUS; SUBCUTANEOUS EVERY 5 MIN PRN
Status: DISCONTINUED | OUTPATIENT
Start: 2024-06-17 | End: 2024-06-17 | Stop reason: HOSPADM

## 2024-06-17 RX ORDER — MORPHINE SULFATE 4 MG/ML
4 INJECTION, SOLUTION INTRAMUSCULAR; INTRAVENOUS EVERY 2 HOUR PRN
Status: DISCONTINUED | OUTPATIENT
Start: 2024-06-17 | End: 2024-06-18

## 2024-06-17 RX ORDER — SODIUM CHLORIDE, SODIUM LACTATE, POTASSIUM CHLORIDE, CALCIUM CHLORIDE 600; 310; 30; 20 MG/100ML; MG/100ML; MG/100ML; MG/100ML
INJECTION, SOLUTION INTRAVENOUS CONTINUOUS
Status: DISCONTINUED | OUTPATIENT
Start: 2024-06-17 | End: 2024-06-18

## 2024-06-17 RX ORDER — LIDOCAINE HYDROCHLORIDE AND EPINEPHRINE 10; 10 MG/ML; UG/ML
INJECTION, SOLUTION INFILTRATION; PERINEURAL AS NEEDED
Status: DISCONTINUED | OUTPATIENT
Start: 2024-06-17 | End: 2024-06-17 | Stop reason: HOSPADM

## 2024-06-17 RX ORDER — LIDOCAINE HYDROCHLORIDE 10 MG/ML
INJECTION, SOLUTION EPIDURAL; INFILTRATION; INTRACAUDAL; PERINEURAL AS NEEDED
Status: DISCONTINUED | OUTPATIENT
Start: 2024-06-17 | End: 2024-06-17 | Stop reason: SURG

## 2024-06-17 RX ORDER — CEFAZOLIN SODIUM 1 G/3ML
INJECTION, POWDER, FOR SOLUTION INTRAMUSCULAR; INTRAVENOUS AS NEEDED
Status: DISCONTINUED | OUTPATIENT
Start: 2024-06-17 | End: 2024-06-17 | Stop reason: SURG

## 2024-06-17 RX ORDER — PHENYLEPHRINE HCL 10 MG/ML
VIAL (ML) INJECTION AS NEEDED
Status: DISCONTINUED | OUTPATIENT
Start: 2024-06-17 | End: 2024-06-17 | Stop reason: SURG

## 2024-06-17 RX ADMIN — LIDOCAINE HYDROCHLORIDE 80 MG: 10 INJECTION, SOLUTION EPIDURAL; INFILTRATION; INTRACAUDAL; PERINEURAL at 13:39:00

## 2024-06-17 RX ADMIN — EPHEDRINE SULFATE 10 MG: 50 INJECTION, SOLUTION INTRAVENOUS at 13:48:00

## 2024-06-17 RX ADMIN — ONDANSETRON 4 MG: 2 INJECTION INTRAMUSCULAR; INTRAVENOUS at 14:10:00

## 2024-06-17 RX ADMIN — DEXAMETHASONE SODIUM PHOSPHATE 8 MG: 4 MG/ML VIAL (ML) INJECTION at 13:48:00

## 2024-06-17 RX ADMIN — SODIUM CHLORIDE, SODIUM LACTATE, POTASSIUM CHLORIDE, CALCIUM CHLORIDE: 600; 310; 30; 20 INJECTION, SOLUTION INTRAVENOUS at 14:18:00

## 2024-06-17 RX ADMIN — EPHEDRINE SULFATE 10 MG: 50 INJECTION, SOLUTION INTRAVENOUS at 13:51:00

## 2024-06-17 RX ADMIN — CEFAZOLIN SODIUM 2 G: 1 INJECTION, POWDER, FOR SOLUTION INTRAMUSCULAR; INTRAVENOUS at 13:47:00

## 2024-06-17 RX ADMIN — PHENYLEPHRINE HCL 50 MCG: 10 MG/ML VIAL (ML) INJECTION at 14:10:00

## 2024-06-17 RX ADMIN — SODIUM CHLORIDE, SODIUM LACTATE, POTASSIUM CHLORIDE, CALCIUM CHLORIDE: 600; 310; 30; 20 INJECTION, SOLUTION INTRAVENOUS at 13:34:00

## 2024-06-17 NOTE — TELEPHONE ENCOUNTER
Patient has procedure today and says when pain medication is sent would like to be sent to [facility] later today. Also asking if anti-nausea can be prescribed tonight after her surgery, request nothing with codeine. Also, asking if she can take pain medication together with anti-nausea.   [facility]

## 2024-06-17 NOTE — BRIEF OP NOTE
Pre-Operative Diagnosis: Thyroglossal duct cyst [Q89.2]  Sebaceous cyst [L72.3]     Post-Operative Diagnosis: Thyroglossal duct cyst [Q89.2]Sebaceous cyst [L72.3]      Procedure Performed:   Thyroglossal duct cyst excision, excision and closure of neck sebaceous cyst    Surgeons and Role:     * Melinda Jaramillo MD - Primary     * Aayush Covarrubias MD    Assistant(s):        Surgical Findings: sebaceous cyst and underlying thyroglossal duct cyst with tract     Specimen: sebaceous cyst, and thyroglossal duct cyst and tract with hyoid bone     Estimated Blood Loss: Blood Output: 5 mL (6/17/2024  2:18 PM)      Dictation Number:  [id number]    Melinda Jaramillo MD  6/17/2024  2:48 PM

## 2024-06-17 NOTE — ANESTHESIA POSTPROCEDURE EVALUATION
Patient: Leonela Faria    Procedure Summary       Date: 06/17/24 Room / Location: [facility] MAIN OR 02 / [facility] MAIN OR    Anesthesia Start: 1334 Anesthesia Stop: 1445    Procedure: Thyroglossal duct cyst excision, excision and closure of neck sebaceous cyst (Neck) Diagnosis:       Thyroglossal duct cyst      Sebaceous cyst      (Thyroglossal duct cyst [Q89.2]Sebaceous cyst [L72.3])    Surgeons: Melinda Jaramillo MD Anesthesiologist: Bret Pérez MD    Anesthesia Type: general ASA Status: 2            Anesthesia Type: general    Vitals Value Taken Time   /70 06/17/24 1444   Temp 97.2 °F (36.2 °C) 06/17/24 1444   Pulse 85 06/17/24 1444   Resp 19 06/17/24 1444   SpO2 100 % 06/17/24 1444   Vitals shown include unfiled device data.    [facility] AN Post Evaluation:   Patient Evaluated in PACU  Patient Participation: complete - patient participated  Level of Consciousness: awake and alert  Pain Score: 0  Pain Management: adequate  Airway Patency:patent  Dental exam unchanged from preop  Yes    Nausea/Vomiting: none  Cardiovascular Status: acceptable  Respiratory Status: acceptable  Postoperative Hydration acceptable      Lukasz Aponte CRNA  6/17/2024 2:45 PM

## 2024-06-17 NOTE — INTERVAL H&P NOTE
Pre-op Diagnosis: Thyroglossal duct cyst [Q89.2]  Sebaceous cyst [L72.3]    The above referenced H&P was reviewed by Melinda Jaramillo MD on 6/17/2024, the patient was examined and no significant changes have occurred in the patient's condition since the H&P was performed.  I discussed with the patient and/or legal representative the potential benefits, risks and side effects of this procedure; the likelihood of the patient achieving goals; and potential problems that might occur during recuperation.  I discussed reasonable alternatives to the procedure, including risks, benefits and side effects related to the alternatives and risks related to not receiving this procedure.  We will proceed with procedure as planned.

## 2024-06-17 NOTE — ANESTHESIA PROCEDURE NOTES
Airway  Date/Time: 6/17/2024 1:40 PM  Urgency: Elective    Airway not difficult    General Information and Staff    Patient location during procedure: OR  Anesthesiologist: Bret Pérez MD  Resident/CRNA: Lukasz Aponte CRNA  Performed: CRNA   Performed by: Lukasz Aponte CRNA  Authorized by: Bret Pérez MD      Indications and Patient Condition  Indications for airway management: anesthesia  Spontaneous Ventilation: absent  Sedation level: deep  Preoxygenated: yes  Patient position: sniffing  MILS not maintained throughout  Mask difficulty assessment: 1 - vent by mask  No planned trial extubation    Final Airway Details  Final airway type: endotracheal airway      Successful airway: ETT  Cuffed: yes   Successful intubation technique: direct laryngoscopy  Facilitating devices/methods: cricoid pressure  Endotracheal tube insertion site: oral  Blade: [place]  Blade size: #2  ETT size (mm): 7.0    Cormack-Lehane Classification: grade IIA - partial view of glottis  Placement verified by: capnometry   Measured from: lips  ETT to lips (cm): 22  Number of attempts at approach: 1  Ventilation between attempts: none  Number of other approaches attempted: 0    Additional Comments  Teeth as preop

## 2024-06-17 NOTE — ANESTHESIA PREPROCEDURE EVALUATION
Anesthesia PreOp Note    HPI:     Leonela Faria is a 68 year old female who presents for preoperative consultation requested by: Melinda Jaramillo MD    Date of Surgery: 6/17/2024    Procedure(s):  Thyroglossal duct cyst excision, excision and closure of neck sebaceous cyst  Indication: Thyroglossal duct cyst [Q89.2]  Sebaceous cyst [L72.3]    Relevant Problems   No relevant active problems       NPO:  Last Liquid Consumption Date: 06/17/24  Last Liquid Consumption Time: 0800  Last Solid Consumption Date: 06/16/24  Last Solid Consumption Time: 2200  Last Liquid Consumption Date: 06/17/24          History Review:  Patient Active Problem List    Diagnosis Date Noted    COVID-19 vaccination refused 08/23/2021    Elevated glucose 08/23/2021    History of thyroid cancer 08/23/2021    Pneumococcal vaccine refused 08/23/2021    Irritable bowel syndrome with diarrhea 07/09/2019       Past Medical History:    Anxiety    Disorder of thyroid    History of stomach ulcers    IBS (irritable bowel syndrome)    Migraines    after surgery migraines    PONV (postoperative nausea and vomiting)       Past Surgical History:   Procedure Laterality Date    Cholecystectomy      Colonoscopy  2000, 7/16    Egd  04/2019    mild erosive gastritis    Upper gi endoscopy,exam         Medications Prior to Admission   Medication Sig Dispense Refill Last Dose    UNITHROID 50 MCG Oral Tab Take 1 tablet (50 mcg total) by mouth before breakfast. 90 tablet 0 6/17/2024    PATIENT SUPPLIED MEDICATION orth molecular products - pyloricil BID   6/10/2024    Multiple Vitamins-Minerals (MULTI-VITAMIN/MINERALS) Oral Tab Take 1 tablet by mouth daily.   6/10/2024    Thiamine HCl (VITAMIN B-1) 50 MG Oral Tab Take 1 tablet (50 mg total) by mouth daily.   6/10/2024    Vitamin C 500 MG Oral Tab Take 1 tablet (500 mg total) by mouth daily.   6/10/2024    LUTEIN-ZEAXANTHIN OR Take 1 capsule by mouth every morning.   6/10/2024    Bilberry, Vaccinium myrtillus,  (BILBERRY OR) Take 1 capsule by mouth every morning.   6/10/2024    BIOTIN OR Take 1 capsule by mouth every morning.   6/10/2024    Cholecalciferol (VITAMIN D3) 1000 units Oral Cap Take 1 tablet by mouth 2 (two) times daily.   6/10/2024    Coenzyme Q10 (COQ10) 30 MG Oral Cap Take 1 capsule by mouth 2 (two) times daily.   6/10/2024    Calcium 500-125 MG-UNIT Oral Tab Take by mouth.   6/10/2024    Boswellia Ivone (BOSWELLIA OR) Take 2 capsules by mouth every evening.   6/10/2024    POTASSIUM OR Take 1 tablet by mouth every evening. (Patient not taking: Reported on 5/9/2024)        Current Facility-Administered Medications Ordered in Epic   Medication Dose Route Frequency Provider Last Rate Last Admin    lactated ringers infusion   Intravenous Continuous Melinda Jaramillo MD 20 mL/hr at 06/17/24 1212 New Bag at 06/17/24 1212     No current [facility]-ordered outpatient medications on file.       Allergies   Allergen Reactions    Compazine [Prochlorperazine] OTHER (SEE COMMENTS)     Cramping, neck spasm     Gentamycin [Gentamicin] HIVES    Gluten Meal HIVES    Codeine NAUSEA AND VOMITING    Corn Oil OTHER (SEE COMMENTS)     Mucus production     Soybean Allergy OTHER (SEE COMMENTS)     Weakness     Vancomycin UNKNOWN     Doesn't want to take it       History reviewed. No pertinent family history.  Social History     Socioeconomic History    Marital status:    Tobacco Use    Smoking status: Never    Smokeless tobacco: Never   Vaping Use    Vaping status: Never Used   Substance and Sexual Activity    Alcohol use: Never    Drug use: Never       Available pre-op labs reviewed.             Vital Signs:  Body mass index is 18.01 kg/m².   height is 1.702 m (5' 7\") and weight is 52.2 kg (115 lb). Her oral temperature is 98 °F (36.7 °C). Her blood pressure is 119/81 and her pulse is 84. Her respiration is 16 and oxygen saturation is 97%.   Vitals:    06/10/24 1403 06/17/24 1158   BP:  119/81   Pulse:  84   Resp:  16   Temp:  98  °F (36.7 °C)   TempSrc:  Oral   SpO2:  97%   Weight: 52.2 kg (115 lb)    Height: 1.702 m (5' 7\")         Anesthesia Evaluation     Patient summary reviewed and Nursing notes reviewed    History of anesthetic complications (ponv)   Airway   Mallampati: III  TM distance: >3 FB  Neck ROM: full  Dental - Dentition appears grossly intact     Pulmonary - negative ROS and normal exam   Cardiovascular - negative ROS and normal exam    ECG reviewed    Neuro/Psych    (+)  anxiety/panic attacks,        GI/Hepatic/Renal - negative ROS     Endo/Other    (+) hypothyroidism  Abdominal                  Anesthesia Plan:   ASA:  2  Plan:   General  Airway:  ETT  Post-op Pain Management: IV analgesics  Plan Comments: I have discussed the anesthetic plan, major risks and alternatives with the patient and answered all questions. The patient desires to proceed with surgery and anesthesia as planned.     Informed Consent Plan and Risks Discussed With:  Patient, spouse and child/children  Discussed plan with:  CRNA      I have informed Leonela Faria and/or legal guardian or family member of the nature of the anesthetic plan, benefits, risks including possible dental damage if relevant, major complications, and any alternative forms of anesthetic management.   All of the patient's questions were answered to the best of my ability. The patient desires the anesthetic management as planned.  Bret Pérez MD  6/17/2024 12:39 PM  Present on Admission:  **None**

## 2024-06-18 ENCOUNTER — TELEPHONE (OUTPATIENT)
Dept: OTOLARYNGOLOGY | Facility: CLINIC | Age: 69
End: 2024-06-18

## 2024-06-18 VITALS
OXYGEN SATURATION: 100 % | TEMPERATURE: 98 F | BODY MASS INDEX: 18.05 KG/M2 | HEIGHT: 67 IN | WEIGHT: 115 LBS | RESPIRATION RATE: 18 BRPM | SYSTOLIC BLOOD PRESSURE: 146 MMHG | HEART RATE: 70 BPM | DIASTOLIC BLOOD PRESSURE: 71 MMHG

## 2024-06-18 LAB
ALBUMIN SERPL-MCNC: 4.1 G/DL (ref 3.2–4.8)
ALBUMIN/GLOB SERPL: 2 {RATIO} (ref 1–2)
ALP LIVER SERPL-CCNC: 53 U/L
ALT SERPL-CCNC: 72 U/L
ANION GAP SERPL CALC-SCNC: 9 MMOL/L (ref 0–18)
AST SERPL-CCNC: 60 U/L (ref ?–34)
BASOPHILS # BLD AUTO: 0.04 X10(3) UL (ref 0–0.2)
BASOPHILS NFR BLD AUTO: 0.3 %
BILIRUB SERPL-MCNC: 0.7 MG/DL (ref 0.2–1.1)
BUN BLD-MCNC: 8 MG/DL (ref 9–23)
BUN/CREAT SERPL: 13.3 (ref 10–20)
CALCIUM BLD-MCNC: 8.7 MG/DL (ref 8.7–10.4)
CHLORIDE SERPL-SCNC: 111 MMOL/L (ref 98–112)
CO2 SERPL-SCNC: 24 MMOL/L (ref 21–32)
CREAT BLD-MCNC: 0.6 MG/DL
DEPRECATED RDW RBC AUTO: 41.8 FL (ref 35.1–46.3)
EGFRCR SERPLBLD CKD-EPI 2021: 97 ML/MIN/1.73M2 (ref 60–?)
EOSINOPHIL # BLD AUTO: 0 X10(3) UL (ref 0–0.7)
EOSINOPHIL NFR BLD AUTO: 0 %
ERYTHROCYTE [DISTWIDTH] IN BLOOD BY AUTOMATED COUNT: 12 % (ref 11–15)
GLOBULIN PLAS-MCNC: 2.1 G/DL (ref 2–3.5)
GLUCOSE BLD-MCNC: 88 MG/DL (ref 70–99)
HCT VFR BLD AUTO: 41.1 %
HGB BLD-MCNC: 14 G/DL
IMM GRANULOCYTES # BLD AUTO: 0.07 X10(3) UL (ref 0–1)
IMM GRANULOCYTES NFR BLD: 0.5 %
LYMPHOCYTES # BLD AUTO: 1.65 X10(3) UL (ref 1–4)
LYMPHOCYTES NFR BLD AUTO: 10.6 %
MAGNESIUM SERPL-MCNC: 1.8 MG/DL (ref 1.6–2.6)
MCH RBC QN AUTO: 32 PG (ref 26–34)
MCHC RBC AUTO-ENTMCNC: 34.1 G/DL (ref 31–37)
MCV RBC AUTO: 93.8 FL
MONOCYTES # BLD AUTO: 1.22 X10(3) UL (ref 0.1–1)
MONOCYTES NFR BLD AUTO: 7.9 %
NEUTROPHILS # BLD AUTO: 12.54 X10 (3) UL (ref 1.5–7.7)
NEUTROPHILS # BLD AUTO: 12.54 X10(3) UL (ref 1.5–7.7)
NEUTROPHILS NFR BLD AUTO: 80.7 %
OSMOLALITY SERPL CALC.SUM OF ELEC: 296 MOSM/KG (ref 275–295)
PLATELET # BLD AUTO: 218 10(3)UL (ref 150–450)
POTASSIUM SERPL-SCNC: 3.2 MMOL/L (ref 3.5–5.1)
PROT SERPL-MCNC: 6.2 G/DL (ref 5.7–8.2)
RBC # BLD AUTO: 4.38 X10(6)UL
SODIUM SERPL-SCNC: 144 MMOL/L (ref 136–145)
WBC # BLD AUTO: 15.5 X10(3) UL (ref 4–11)

## 2024-06-18 NOTE — PROGRESS NOTES
ENT POD #1  Afebrile vital signs stable  C/o pain as expected  Physical exam:  Neck: incision dry and clean.  No erythema  No calf tenderness  Impression: feeling better overall.  Starting to tolerate orals  Plan: liquid diet and applesauce for today.  Needs to try liquid tramadol.  Discharge on keflex 500 mg TID for 1 week.  Tramadol and zofran.  Follow up in my office in 1 week for suture removal.

## 2024-06-18 NOTE — PLAN OF CARE
Patient is A/ox4, on RA, tolerating clears, received PRN Zofran for nausea, PRN liquid Acetaminophen given for pain management, up x1 SBA with rolling walker, voiding freely, no BM overnight, IVF and antibiotics continued, surgical dressing is clean/dry/intact, vital signs stable, no acute changes overnight. Call light within reach, safety measures in place, frequent rounding done, plan of care continued.

## 2024-06-18 NOTE — TELEPHONE ENCOUNTER
Post op day #1 thyroglossal duct cyst excision, excision and closure of neck sebaceous cyst    Patient is currently hospitalized in [facility]

## 2024-06-18 NOTE — PLAN OF CARE
Pt is A&Ox 4, room air, tolerating clear liquids, voiding freely, tolerating pain with PO liquid tylenol, ambulating independent. Call light within reach, calls appropriately, I-bed awareness/alarm on while admitted. Cleared by MD to discharge home. Family at bedside, discharge instructions provided - see AVS - verbalized understanding, all questions answered to the best of my ability.     Problem: Patient Centered Care  Goal: Patient preferences are identified and integrated in the patient's plan of care  Description: Interventions:  - What would you like us to know as we care for you? Active while at home - typically walks daily   - Provide timely, complete, and accurate information to patient/family  - Incorporate patient and family knowledge, values, beliefs, and cultural backgrounds into the planning and delivery of care  - Encourage patient/family to participate in care and decision-making at the level they choose  - Honor patient and family perspectives and choices  Outcome: Adequate for Discharge     Problem: Patient/Family Goals  Goal: Patient/Family Long Term Goal  Description: Patient's Long Term Goal: return home    Interventions:  - tolerate diet, tolerate pain, tolerate sx  - See additional Care Plan goals for specific interventions  Outcome: Adequate for Discharge  Goal: Patient/Family Short Term Goal  Description: Patient's Short Term Goal: tolerate sx    Interventions:   - PRN meds, non-pharm techniques, early ambulation   - See additional Care Plan goals for specific interventions  Outcome: Adequate for Discharge

## 2024-06-18 NOTE — TELEPHONE ENCOUNTER
[employer] [id number] said no PA is required for Tramadol. Left a VM with the [facility] pharmacy to let them know that no PA was required.

## 2024-06-18 NOTE — PROGRESS NOTES
Patient feeling too much nausea.  Not able to adequately hydrate herself.  Will admit to observation.

## 2024-06-18 NOTE — TELEPHONE ENCOUNTER
Patients  states that the patient just had surgery and he is not able to get the Tramadol medication filled, as a prior auth is needed.

## 2024-06-18 NOTE — H&P
[facility] Hospitalist H&P       CC: No chief complaint on file.       PCP: Izzy Burden MD    Date of Admission: 6/17/2024 11:51 AM    ASSESSMENT / PLAN:     Ms. Faria is a 69 yo F with PMH of anxiety, IBS, PONV, who presented for thyroglossal duct cyst excision.     thyroglossal duct cyst excision.  - ENT following  - plan for PO abx on discharge  - did not tolerate tramadol, plan for tylenol on discharge  - liquid diet per ENT    PONV  - zofran PRN    Anxiety    FN:  - IVF:   - Diet: liquid diet    DVT Prophy: SCD  Lines:PIV    Dispo: discharge home.     Outpatient records or previous hospital records reviewed.     Further recommendations pending patient's clinical course.  [facility] hospitalist to continue to follow patient while in house    Patient and/or patient's family given opportunity to ask questions and note understanding and agreeing with therapeutic plan as outlined    Yen Mulligan MD  [facility] Hospitalist  Answering Service number: [phone]    HPI       History of Present Illness:     Ms. Faria is a 69 yo F with PMH of anxiety, IBS, PONV, who presented for thyroglossal duct cyst excision. Had issues with post op nausea and was kept overnight. States she normally has issues with post op N/V.       PMH  Past Medical History:    Anxiety    Disorder of thyroid    History of stomach ulcers    IBS (irritable bowel syndrome)    Migraines    after surgery migraines    PONV (postoperative nausea and vomiting)        PSH  Past Surgical History:   Procedure Laterality Date    Cholecystectomy      Colonoscopy  2000, 7/16    Egd  04/2019    mild erosive gastritis    Upper gi endoscopy,exam          ALL:  Allergies   Allergen Reactions    Compazine [Prochlorperazine] OTHER (SEE COMMENTS)     Cramping, neck spasm     Gentamycin [Gentamicin] HIVES    Gluten Meal HIVES    Codeine NAUSEA AND VOMITING    Corn Oil OTHER (SEE COMMENTS)     Mucus production     Soybean Allergy OTHER (SEE COMMENTS)     Weakness     Vancomycin  UNKNOWN     Doesn't want to take it        Home Medications:  Outpatient Medications Marked as Taking for the 6/17/24 encounter (Hospital Encounter)   Medication Sig Dispense Refill    cephalexin 500 MG Oral Cap Take 1 capsule (500 mg total) by mouth every 8 (eight) hours. 21 capsule 0    traMADol 50 MG Oral Tab Take 1-2 tablets ( mg total) by mouth every 4 (four) hours as needed for Pain. 20 tablet 0    ondansetron (ZOFRAN) 4 mg tablet Take 1 tablet (4 mg total) by mouth every 8 (eight) hours as needed for Nausea. 6 tablet 0    UNITHROID 50 MCG Oral Tab Take 1 tablet (50 mcg total) by mouth before breakfast. 90 tablet 0    PATIENT SUPPLIED MEDICATION orth molecular products - pyloricil BID      Multiple Vitamins-Minerals (MULTI-VITAMIN/MINERALS) Oral Tab Take 1 tablet by mouth daily.      Thiamine HCl (VITAMIN B-1) 50 MG Oral Tab Take 1 tablet (50 mg total) by mouth daily.      Vitamin C 500 MG Oral Tab Take 1 tablet (500 mg total) by mouth daily.      LUTEIN-ZEAXANTHIN OR Take 1 capsule by mouth every morning.      Bilberry, Vaccinium myrtillus, (BILBERRY OR) Take 1 capsule by mouth every morning.      BIOTIN OR Take 1 capsule by mouth every morning.      Cholecalciferol (VITAMIN D3) 1000 units Oral Cap Take 1 tablet by mouth 2 (two) times daily.      Coenzyme Q10 (COQ10) 30 MG Oral Cap Take 1 capsule by mouth 2 (two) times daily.      Calcium 500-125 MG-UNIT Oral Tab Take by mouth.      Boswellia Ivone (BOSWELLIA OR) Take 2 capsules by mouth every evening.           Soc Hx  Social History     Tobacco Use    Smoking status: Never    Smokeless tobacco: Never   Substance Use Topics    Alcohol use: Never        Fam Hx  History reviewed. No pertinent family history.    Review of Systems  Comprehensive ROS reviewed and negative except for what's stated above.     OBJECTIVE:  /58 (BP Location: Right arm)   Pulse 75   Temp 97.7 °F (36.5 °C) (Oral)   Resp 18   Ht 5' 7\" (1.702 m)   Wt 115 lb (52.2 kg)    SpO2 100%   BMI 18.01 kg/m²     GEN: female in NAD, strong voice  HEENT: EOM  Pulm: CTAB, no crackles or wheezes  CV: RRR, no murmurs  ABD: Soft, non-tender, non-distended, +BS  SKIN: warm, dry  EXT: no edema    Diagnostic Data:    CBC/Chem    No results for input(s): \"RBC\", \"HGB\", \"HCT\", \"MCV\", \"MCH\", \"MCHC\", \"RDW\", \"NEPRELIM\", \"WBC\", \"PLT\" in the last 168 hours.      No results for input(s): \"GLU\", \"BUN\", \"CREATSERUM\", \"GFRAA\", \"GFRNAA\", \"EGFRCR\", \"CA\", \"NA\", \"K\", \"CL\", \"CO2\" in the last 168 hours.       No results for input(s): \"TROP\" in the last 168 hours.    Additional Diagnostics    Radiology: No results found.

## 2024-06-18 NOTE — OPERATIVE REPORT
[facility]    PATIENT'S NAME: MATTHEW MAZARIEGOS   ATTENDING PHYSICIAN: Melinda Jaramillo MD   OPERATING PHYSICIAN: Melinda Jaramillo MD   PATIENT ACCOUNT#:   [id number]    LOCATION:  [address] [place] [address]  MEDICAL RECORD #:   [id number]       YOB: 1955  ADMISSION DATE:       06/17/2024      OPERATION DATE:  06/17/2024    OPERATIVE REPORT      PREOPERATIVE DIAGNOSIS:  Sebaceous cyst and thyroglossal duct cyst.  POSTOPERATIVE DIAGNOSIS:  Sebaceous cyst and thyroglossal duct cyst.  PROCEDURE:  Excision of sebaceous cyst and thyroglossal duct cyst.    ASSISTANT SURGEON:  Aayush Covarrubias MD.    ANESTHESIA:  General by oral endotracheal tube.    ESTIMATED BLOOD LOSS:   5 mL.    COMPLICATIONS:  None.    SPECIMENS:  Sebaceous cyst, thyroglossal duct cyst with tract and central portion of hyoid bone.    INDICATIONS:  This is a 68-year-old female who had an infection of her thyroglossal duct cyst.  This required incision and drainage in the office.  A CT scan was done which confirmed the diagnosis.  She also had a separate sebaceous cyst overlying the area.  For the above-mentioned reason, the procedure was indicated.    OPERATIVE TECHNIQUE:  Patient was taken to the operating room suite, placed under general anesthesia and an oral endotracheal tube was placed.  She was given 2 g IV Ancef preoperatively.  A total of 3 mL of 1% lidocaine with 1:100,000 epinephrine was infiltrated into the area underlying the skin.  An elliptical incision was made around this area.  The sebaceous cyst was removed.  The underlying thyroglossal duct cyst was identified and dissected down to the hyoid bone.  The hyoid bone was skeletonized, and the midportion of the hyoid bone was removed using a bone rongeur.  After this was done, the same thing was done on the contralateral side.  There was a tract that was noted to be deep to the hyoid bone, and this was followed down to the base of the tongue.  A 3-0 silk tie was used to tie  this off.  The strap muscles were then closed with a 4-0 chromic suture.  There was a 2 layered closure of the subcutaneous tissue and platysma.  This was an interrupted 4-0 chromic, and this was followed by a 5-0 nylon closure of the skin.  Antibiotic bacitracin ointment was then placed over the incision.  Fluffs and a stockinette were then placed over this.  Patient tolerated the procedure well, was extubated in the operating room suite and taken to the recovery room in good condition.    Dictated By Melinda Jaramillo MD  d: 06/17/2024 14:52:43  t: 06/17/2024 23:35:18  [facility] [id number]  [facility]/

## 2024-06-19 ENCOUNTER — TELEPHONE (OUTPATIENT)
Dept: OTOLARYNGOLOGY | Facility: CLINIC | Age: 69
End: 2024-06-19

## 2024-06-19 NOTE — TELEPHONE ENCOUNTER
Patient wants to schedule post op appointment for suture removal. Per post op instructions, appointment  should be 1 week after 6/17 surgery. Please call.

## 2024-06-19 NOTE — DISCHARGE SUMMARY
General Medicine Discharge Summary     Patient ID:  Leonela Faria  68 year old  10/12/1955    Admit date: 6/17/2024    Discharge date and time: 06/18/24    Attending Physician: No att. providers found     Primary Care Physician: Izzy Burden MD     Reason for admission: thyroglossal duct cyst     Discharge Diagnoses: Thyroglossal duct cyst [Q89.2]  Sebaceous cyst [L72.3]  Thyroglossal duct cyst    Discharged Condition: stable    Disposition: home    Consults:   Consultants         Provider   Role Specialty     Diana Milligan MD      Consulting Physician HOSPITALIST              HPI: Ms. Faria is a 67 yo F with PMH of anxiety, IBS, PONV, who presented for thyroglossal duct cyst excision. Had issues with post op nausea and was kept overnight. States she normally has issues with post op N/V.     Hospital Course:     Ms. Faria is a 67 yo F with PMH of anxiety, IBS, PONV, who presented for thyroglossal duct cyst excision. Post op course with N/V, unable to discharge same day, monitored overnight and stable for discharge home on POD#1. Did not tolerate liquid tramadol, plan for liquid tylenol at home for pain control.       thyroglossal duct cyst excision.  - ENT following  - plan for PO abx on discharge  - did not tolerate tramadol, plan for tylenol on discharge  - liquid diet per ENT     PONV  - zofran PRN     Anxiety      Exam  GEN: female in NAD, strong voice  HEENT: EOM  Pulm: CTAB, no crackles or wheezes  CV: RRR, no murmurs  ABD: Soft, non-tender, non-distended, +BS  SKIN: warm, dry  EXT: no edema    Operative Procedures: Procedure(s) (LRB):  Thyroglossal duct cyst excision, excision and closure of neck sebaceous cyst (N/A)     Imaging: No results found.        Home Medication Changes:     I reconciled current and discharge medications on day of discharge. These medication changes have been made as below         Medication List        START taking these medications      cephalexin 500 MG Caps  Commonly  known as: Keflex  Take 1 capsule (500 mg total) by mouth every 8 (eight) hours.     ondansetron 4 mg tablet  Commonly known as: Zofran  Take 1 tablet (4 mg total) by mouth every 8 (eight) hours as needed for Nausea.            CONTINUE taking these medications      Unithroid 50 MCG Tabs  Generic drug: levothyroxine            STOP taking these medications      BILBERRY OR     BIOTIN OR     BOSWELLIA OR     Calcium 500-125 MG-UNIT Tabs     CoQ10 30 MG Caps     LUTEIN-ZEAXANTHIN OR     Multi-Vitamin/Minerals Tabs     PATIENT SUPPLIED MEDICATION     POTASSIUM OR     vitamin B-1 50 MG Tabs     Vitamin C 500 MG Tabs  Commonly known as: VITAMIN C     Vitamin D3 25 MCG (1000 UT) Caps               Where to Get Your Medications        These medications were sent to [facility]/pharmacy #[id number] - [place], [place] - [address] [facility], [phone], [phone]  [address], [facility] [address]      Hours: 24-hours Phone: [phone]   cephalexin 500 MG Caps  ondansetron 4 mg tablet         Activity:  as instructed  Diet:  liquid diet  Wound Care: keep wound clean and dry  Code Status: No Order  O2: n/a    Follow-up with:    PCP   Specialist ENT       FU   Follow-up Information       Melinda Jaramillo MD Follow up in 1 week(s).    Specialty: OTOLARYNGOLOGY  Why: For suture removal  Contact information:  [address]  [facility] [id number] [phone]                             DC instructions:      Other Discharge Instructions:          HOME INSTRUCTIONS  Keep incision dry and clean  Keep dressing on the neck for 48 hours.  After the dressing comes off apply triple antibiotic ointment to the incision twice daily.  Prescriptions Keflex, Zofran, and tramadol.  Okay to also take aspirin along with the tramadol.  No aspirin Advil Aleve ibuprofen or Motrin.  Liquid diet only for 48 hours this can be either clear or full liquids.  Soft diet after that no sharp or crunchy foods for 1 week.  Follow-up in  my office in 1 week's time for suture removal.  Can only take a shower if the water does not hit your neck.  If not, I would take a bath and just wash with a washcloth above the shoulder area but not the incision.    AMBSURG HOME CARE INSTRUCTIONS: POST-OP ANESTHESIA  The medication that you received for sedation or general anesthesia can last up to 24 hours. Your judgment and reflexes may be altered, even if you feel like your normal self.      We Recommend:   Do not drive any motor vehicle or bicycle   Avoid mowing the lawn, playing sports, or working with power tools/applicances (power saws, electric knives or mixers)   That you have someone stay with you on your first night home   Do not drink alcohol or take sleeping pills or tranquilizers   Do not sign legal documents within 24 hours of your procedure   If you had a nerve block for your surgery, take extra care not to put any pressure on your arm or hand for 24 hours    It is normal:  For you to have a sore throat if you had a breathing tube during surgery (while you were asleep!). The sore throat should get better within 48 hours. You can gargle with warm salt water (1/2 tsp in 4 oz warm water) or use a throat lozenge for comfort  To feel muscle aches or soreness especially in the abdomen, chest or neck. The achy feeling should go away in the next 24 hours  To feel weak, sleepy or \"wiped out\". Your should start feeling better in the next 24 hours.   To experience mild discomforts such as sore lip or tongue, headache, cramps, gas pains or a bloated feeling in your abdomen.   To experience mild back pain or soreness for a day or two if you had spinal or epidural anesthesia.   If you had laparoscopic surgery, to feel shoulder pain or discomfort on the day of surgery.   For some patients to have nausea after surgery/anesthesia    If you feel nausea or experience vomiting:   Try to move around less.   Eat less than usual or drink only liquids until the next  morning   Nausea should resolve in about 24 hours    If you have a problem when you are at home:    Call your surgeons office   Discharge Instructions: After Your Surgery  You’ve just had surgery. During surgery, you were given medicine called anesthesia to keep you relaxed and free of pain. After surgery, you may have some pain or nausea. This is common. Here are some tips for feeling better and getting well after surgery.   Going home  Your healthcare provider will show you how to take care of yourself when you go home. They'll also answer your questions. Have an adult family member or friend drive you home. For the first 24 hours after your surgery:   Don't drive or use heavy equipment.  Don't make important decisions or sign legal papers.  Take medicines as directed.  Don't drink alcohol.  Have someone stay with you, if needed. They can watch for problems and help keep you safe.  Be sure to go to all follow-up visits with your healthcare provider. And rest after your surgery for as long as your provider tells you to.   Coping with pain  If you have pain after surgery, pain medicine will help you feel better. Take it as directed, before pain becomes severe. Also, ask your healthcare provider or pharmacist about other ways to control pain. This might be with heat, ice, or relaxation. And follow any other instructions your surgeon or nurse gives you.      Stay on schedule with your medicine.     Tips for taking pain medicine  To get the best relief possible, remember these points:   Pain medicines can upset your stomach. Taking them with a little food may help.  Most pain relievers taken by mouth need at least 20 to 30 minutes to start to work.  Don't wait till your pain becomes severe before you take your medicine. Try to time your medicine so that you can take it before starting an activity. This might be before you get dressed, go for a walk, or sit down for dinner.  Constipation is a common side effect of some  pain medicines. Call your healthcare provider before taking any medicines such as laxatives or stool softeners to help ease constipation. Also ask if you should skip any foods. Drinking lots of fluids and eating foods such as fruits and vegetables that are high in fiber can also help. Remember, don't take laxatives unless your surgeon has prescribed them.  Drinking alcohol and taking pain medicine can cause dizziness and slow your breathing. It can even be deadly. Don't drink alcohol while taking pain medicine.  Pain medicine can make you react more slowly to things. Don't drive or run machinery while taking pain medicine.  Your healthcare provider may tell you to take acetaminophen to help ease your pain. Ask them how much you're supposed to take each day. Acetaminophen or other pain relievers may interact with your prescription medicines or other over-the-counter (OTC) medicines. Some prescription medicines have acetaminophen and other ingredients in them. Using both prescription and OTC acetaminophen for pain can cause you to accidentally overdose. Read the labels on your OTC medicines with care. This will help you to clearly know the list of ingredients, how much to take, and any warnings. It may also help you not take too much acetaminophen. If you have questions or don't understand the information, ask your pharmacist or healthcare provider to explain it to you before you take the OTC medicine.   Managing nausea  Some people have an upset stomach (nausea) after surgery. This is often because of anesthesia, pain, or pain medicine, less movement of food in the stomach, or the stress of surgery. These tips will help you handle nausea and eat healthy foods as you get better. If you were on a special food plan before surgery, ask your healthcare provider if you should follow it while you get better. Check with your provider on how your eating should progress. It may depend on the surgery you had. These general tips  may help:   Don't push yourself to eat. Your body will tell you when to eat and how much.  Start off with clear liquids and soup. They're easier to digest.  Next try semi-solid foods as you feel ready. These include mashed potatoes, applesauce, and gelatin.  Slowly move to solid foods. Don’t eat fatty, rich, or spicy foods at first.  Don't force yourself to have 3 large meals a day. Instead eat smaller amounts more often.  Take pain medicines with a small amount of solid food, such as crackers or toast. This helps prevent nausea.  When to call your healthcare provider  Call your healthcare provider right away if you have any of these:   You still have too much pain, or the pain gets worse, after taking the medicine. The medicine may not be strong enough. Or there may be a complication from the surgery.  You feel too sleepy, dizzy, or groggy. The medicine may be too strong.  Side effects such as nausea or vomiting. Your healthcare provider may advise taking other medicines to .  Skin changes such as rash, itching, or hives. This may mean you have an allergic reaction. Your provider may advise taking other medicines.  The incision looks different (for instance, part of it opens up).  Bleeding or fluid leaking from the incision site, and weren't told to expect that.  Fever of 100.4°F (38°C) or higher, or as directed by your provider.  Call 911  Call 911 right away if you have:   Trouble breathing  Facial swelling    If you have obstructive sleep apnea   You were given anesthesia medicine during surgery to keep you comfortable and free of pain. After surgery, you may have more apnea spells because of this medicine and other medicines you were given. The spells may last longer than normal.    At home:  Keep using the continuous positive airway pressure (CPAP) device when you sleep. Unless your healthcare provider tells you not to, use it when you sleep, day or night. CPAP is a common device used to treat obstructive  sleep apnea.  Talk with your provider before taking any pain medicine, muscle relaxants, or sedatives. Your provider will tell you about the possible dangers of taking these medicines.  Contact your provider if your sleeping changes a lot even when taking medicines as directed.  Jessie last reviewed this educational content on 10/1/2021  © 4587-9477 [employer]. All rights reserved. This information is not intended as a substitute for professional medical care. Always follow your healthcare professional's instructions.                  Follow-up with labs: none    Total Time Coordinating Care: 31 minutes    Patient had opportunity to ask questions and state understand and agree with therapeutic plan as outlined      Yen Mulligan MD  [facility] Hospitalist

## 2024-06-19 NOTE — TELEPHONE ENCOUNTER
Patient calling because she had a lot of pain in her neck and throat. Patient had surgery on 6/17. Patient would like to speak with nurse about pain and what to do. Please call.

## 2024-06-19 NOTE — TELEPHONE ENCOUNTER
Reviewed post op instructions below. Per pt c/o of pain. States pain is 8/10, pt would like increase tylenol dosage. Per , pt  can increase tylenol to 1000 mg not to exceed 4000 mg  a day. Also pt advised can take tablet form of pain medication and pt can remove pressure dressing today or tomorrow.  Pt verbalized understanding.   Keep incision dry and clean  Keep dressing on the neck for 48 hours.  After the dressing comes off apply triple  antibiotic ointment to the incision twice daily.  Prescriptions Keflex, Zofran, and tramadol.  Okay to also take aspirin along with the  tramadol.

## 2024-06-20 ENCOUNTER — OFFICE VISIT (OUTPATIENT)
Dept: OTOLARYNGOLOGY | Facility: CLINIC | Age: 69
End: 2024-06-20

## 2024-06-20 ENCOUNTER — TELEPHONE (OUTPATIENT)
Dept: OTOLARYNGOLOGY | Facility: CLINIC | Age: 69
End: 2024-06-20

## 2024-06-20 VITALS — HEIGHT: 67 IN | BODY MASS INDEX: 18.05 KG/M2 | WEIGHT: 115 LBS

## 2024-06-20 DIAGNOSIS — R13.12 OROPHARYNGEAL DYSPHAGIA: Primary | ICD-10-CM

## 2024-06-20 DIAGNOSIS — Q89.2 THYROGLOSSAL DUCT CYST: ICD-10-CM

## 2024-06-20 PROCEDURE — 99024 POSTOP FOLLOW-UP VISIT: CPT | Performed by: SPECIALIST

## 2024-06-20 PROCEDURE — 31575 DIAGNOSTIC LARYNGOSCOPY: CPT | Performed by: SPECIALIST

## 2024-06-20 NOTE — TELEPHONE ENCOUNTER
Per patient states she feels throat very swollen, almost feels like she is going to choke, call transferred to RN.

## 2024-06-20 NOTE — TELEPHONE ENCOUNTER
Per throat feels swollen, no SOB of noted and pt talking in complete and full sentences. Per pt able to drink liquids. Would like throat to be checked, pt scheduled with  today.

## 2024-06-21 ENCOUNTER — TELEPHONE (OUTPATIENT)
Dept: OTOLARYNGOLOGY | Facility: CLINIC | Age: 69
End: 2024-06-21

## 2024-06-21 NOTE — PROGRESS NOTES
Leonela Faria is a 68 year old female.   Chief Complaint   Patient presents with    Post-Op     Post-op visit- throat check for swelling     HPI:   Patient here is postoperative day #2.  He has difficulty swallowing.  He has a cough.    Current Outpatient Medications   Medication Sig Dispense Refill    cephalexin 500 MG Oral Cap Take 1 capsule (500 mg total) by mouth every 8 (eight) hours. 21 capsule 0    ondansetron (ZOFRAN) 4 mg tablet Take 1 tablet (4 mg total) by mouth every 8 (eight) hours as needed for Nausea. 6 tablet 0    UNITHROID 50 MCG Oral Tab Take 1 tablet (50 mcg total) by mouth before breakfast. 90 tablet 0      Past Medical History:    Anxiety    Disorder of thyroid    History of stomach ulcers    IBS (irritable bowel syndrome)    Migraines    after surgery migraines    PONV (postoperative nausea and vomiting)      Social History:  Social History     Socioeconomic History    Marital status:    Tobacco Use    Smoking status: Never    Smokeless tobacco: Never   Vaping Use    Vaping status: Never Used   Substance and Sexual Activity    Alcohol use: Never    Drug use: Never     Social Determinants of Health     Food Insecurity: No Food Insecurity (6/17/2024)    Food Insecurity     Food Insecurity: Never true   Transportation Needs: No Transportation Needs (6/17/2024)    Transportation Needs     Lack of Transportation: No   Housing Stability: Low Risk  (6/17/2024)    Housing Stability     Housing Instability: No        REVIEW OF SYSTEMS:   GENERAL HEALTH: feels well otherwise  GENERAL : denies fever, chills, sweats, weight loss, weight gain  SKIN: denies any unusual skin lesions or rashes  RESPIRATORY: denies shortness of breath with exertion  NEURO: denies headaches    EXAM:   Ht 5' 7\" (1.702 m)   Wt 115 lb (52.2 kg)   BMI 18.01 kg/m²   System Details   Skin Inspection - Normal.   Constitutional Overall appearance - Normal.   Head/Face Facial features - Normal. Eyebrows - Normal. Skull -  Normal.   Eyes Conjunctiva - Right: Normal, Left: Normal. Pupil - Right: Normal, Left: Normal.    Ears Inspection - Right: Normal, Left: Normal.   Canal - Right: Normal, Left: Normal.    TM - Right: Normal, Left: Normal.   Nasal External nose - Normal.   Nasal septum - Normal.  Turbinates - Normal  Incision healing well.  No erythema over the suture line.  Dressing removed and triple antibiotic ointment placed.   Oral/Oropharynx Lips - Normal, Tonsils - Normal, Tongue - Normal.  Soft palate with slight excoriation on the left   Neck Exam Inspection - Normal. Palpation - Normal. Parotid gland - Normal. Thyroid gland - Normal.   Lymph Detail Submental. Submandibular. Anterior cervical. Posterior cervical. Supraclavicular.  All without enlargement   Psychiatric Orientation - Oriented to time, place, person & situation. Appropriate mood and affect.   Neurological Memory - Normal. Cranial nerves - Cranial nerves II through XII grossly intact.   Lungs Clear to auscultation   Nasopharynx Normal by fiberoptic exam   Larynx Consent was obtained for the procedure.  The nose was asesthetized with 1% neosynephrine and 4% lidocaine topical drops.    The scope was placed into the bilateral nares as well as the nasopharynx, hypopharynx and larynx.    All of which were examined.  The  entire larynx including the vallecula, epiglottis, true and false vocal cords, aryepiglottic folds, piriform sinuses and post cricord area were examined for tumors and infectious processes as well as for evidence of reflux and retained secretions.  Vocal cord mobility was also assessed.    Any abnormalities are noted in the exam section.  Many retained secretions.  Normal vocal cord mobility.  No significant edema noted.  No tumors or masses seen.     ASSESSMENT AND PLAN:   1. Thyroglossal duct cyst  Incision healing nicely.    2. Oropharyngeal dysphagia  The retained secretions.  Asked patient to continue clear liquid diet.  Follow-up on Monday for  suture removal.  Call or follow-up with additional questions or problems.      The patient indicates understanding of these issues and agrees to the plan.      Melinda Jaramillo MD  6/20/2024  10:20 PM

## 2024-06-21 NOTE — TELEPHONE ENCOUNTER
please see note below and advise   
Patient informed of note below .   
Per patient stopped antibiotic as instructed by Dr. Jaramillo and is continuing to have diarrhea. Per patient she is consuming apple juice, apple sauce, frozen grape juice. Per patient she is getting weaker and asking if Dr. Jaramillo thinks the anesthesia is coming out and if she thinks the antibiotics are still coming out. Per patient she stopped taking the extra strength tylenol. Per patient asking if she should continue with the liquid diet and let the diarrhea keep coming out. Please advise  
She is eating baby food as well.  I would try something banana, and rice based to slow down the diarrhea.  She can also continue her probiotic.  She had asked about a stool softener before surgery.  She obviously shouldn't be taking this.  All of the fruit juice probably isn't helping the diarrhea.
1

## 2024-06-21 NOTE — PATIENT INSTRUCTIONS
He had many retained secretions.  Continue liquid diet.  Follow-up on Monday, sooner if problems.

## 2024-06-24 ENCOUNTER — OFFICE VISIT (OUTPATIENT)
Dept: OTOLARYNGOLOGY | Facility: CLINIC | Age: 69
End: 2024-06-24

## 2024-06-24 VITALS — WEIGHT: 115 LBS | HEIGHT: 67 IN | BODY MASS INDEX: 18.05 KG/M2

## 2024-06-24 DIAGNOSIS — Z88.1 ALLERGY TO NEOSPORIN: ICD-10-CM

## 2024-06-24 DIAGNOSIS — Q89.2 THYROGLOSSAL DUCT CYST: Primary | ICD-10-CM

## 2024-06-24 PROCEDURE — 99024 POSTOP FOLLOW-UP VISIT: CPT | Performed by: SPECIALIST

## 2024-06-24 NOTE — PROGRESS NOTES
Postoperative day #7  Patient's status post thyroglossal duct cyst removal.  Reported some erythema over the incision over the weekend.  Had been using Neosporin ointment.  Physical examination:  Suture line intact.  Sutures removed.  Some erythema around the suture line.  Steri-Strips placed and wound redressed.  Impression: Probable allergic reaction to Neosporin ointment.  Plan: Sutures removed.  Dressing applied.  Patient to follow-up on Wednesday.  Continue Bactrim DS.  Will remove dressing on Wednesday and reinspect wound.

## 2024-06-24 NOTE — PATIENT INSTRUCTIONS
You very likely had a Neosporin allergy.  Your sutures removed on the day to your visit.  Finish the Bactrim.  Leave the dressing on.  Follow-up in 2 days time so the dressing can be removed.  At that point we will start some mupirocin ointment.

## 2024-06-25 ENCOUNTER — HOSPITAL ENCOUNTER (EMERGENCY)
Facility: HOSPITAL | Age: 69
Discharge: HOME | End: 2024-06-25
Attending: EMERGENCY MEDICINE

## 2024-06-25 VITALS
RESPIRATION RATE: 19 BRPM | DIASTOLIC BLOOD PRESSURE: 57 MMHG | WEIGHT: 115 LBS | TEMPERATURE: 98 F | SYSTOLIC BLOOD PRESSURE: 121 MMHG | OXYGEN SATURATION: 98 % | HEART RATE: 84 BPM | BODY MASS INDEX: 18 KG/M2

## 2024-06-25 DIAGNOSIS — Z48.89 ENCOUNTER FOR POST SURGICAL WOUND CHECK: Primary | ICD-10-CM

## 2024-06-25 PROCEDURE — 99282 EMERGENCY DEPT VISIT SF MDM: CPT

## 2024-06-25 PROCEDURE — 99283 EMERGENCY DEPT VISIT LOW MDM: CPT

## 2024-06-25 NOTE — ED QUICK NOTES
Non-adherent gauze applied to steri-strips, and neck dressed with woven gauze and kerlix, loosely.

## 2024-06-25 NOTE — ED INITIAL ASSESSMENT (HPI)
Pt to ed w/c of bleeding in the neck post suture removal after thyroid sx by Dr Jaramillo. Pt also reports weakness. In no apparent distress.

## 2024-06-26 ENCOUNTER — OFFICE VISIT (OUTPATIENT)
Dept: OTOLARYNGOLOGY | Facility: CLINIC | Age: 69
End: 2024-06-26

## 2024-06-26 DIAGNOSIS — Q89.2 THYROGLOSSAL DUCT CYST: Primary | ICD-10-CM

## 2024-06-26 PROCEDURE — 99024 POSTOP FOLLOW-UP VISIT: CPT | Performed by: SPECIALIST

## 2024-06-26 RX ORDER — SULFAMETHOXAZOLE AND TRIMETHOPRIM 800; 160 MG/1; MG/1
1 TABLET ORAL 2 TIMES DAILY
Qty: 14 TABLET | Refills: 0 | Status: SHIPPED | OUTPATIENT
Start: 2024-06-26 | End: 2024-07-03

## 2024-06-26 RX ORDER — PANTOPRAZOLE SODIUM 40 MG/1
40 TABLET, DELAYED RELEASE ORAL DAILY
COMMUNITY
Start: 2022-11-03

## 2024-06-26 RX ORDER — TRAMADOL HYDROCHLORIDE 50 MG/1
50 TABLET ORAL EVERY 6 HOURS PRN
Qty: 15 TABLET | Refills: 0 | Status: SHIPPED | OUTPATIENT
Start: 2024-06-26

## 2024-06-26 NOTE — ED PROVIDER NOTES
Patient Seen in: [facility] Emergency Department      History     Chief Complaint   Patient presents with    Bleeding     Stated Complaint: Bleeding on neck    Subjective:   HPI    68 year old female with anxiety, IBS, h/o stomach ulcer, migraines, thyroid d/o who is 1 week post-op from excision of sebaceous and thyroglossal duct cyst and now presents for bleeding from wound and wound check. Pt had sutures removed in office earlier, steri-strips placed, she reports feeling well at that time.  Patient states this evening she woke up and felt liquid on her shirt, then noticed liquid on the floor in the bathroom and noticed that her bandage was soaked in a pink bloody substance.  She otherwise feels well and has no pain to the area, denies any gia blood.    Objective:   Past Medical History:    Anxiety    Disorder of thyroid    History of stomach ulcers    IBS (irritable bowel syndrome)    Migraines    after surgery migraines    PONV (postoperative nausea and vomiting)              Past Surgical History:   Procedure Laterality Date    Cholecystectomy      Colonoscopy  2000, 7/16    Egd  04/2019    mild erosive gastritis    Other surgical history  06/17/2024    Excision of sebaceous cyst and thyroglossal duct cyst    Upper gi endoscopy,exam                  Social History     Socioeconomic History    Marital status:    Tobacco Use    Smoking status: Never    Smokeless tobacco: Never   Vaping Use    Vaping status: Never Used   Substance and Sexual Activity    Alcohol use: Never    Drug use: Never     Social Determinants of Health     Food Insecurity: No Food Insecurity (6/17/2024)    Food Insecurity     Food Insecurity: Never true   Transportation Needs: No Transportation Needs (6/17/2024)    Transportation Needs     Lack of Transportation: No   Housing Stability: Low Risk  (6/17/2024)    Housing Stability     Housing Instability: No              Review of Systems    Positive for stated Chief Complaint:  Bleeding    Other systems are as noted in HPI.  Constitutional and vital signs reviewed.      All other systems reviewed and negative except as noted above.    Physical Exam     ED Triage Vitals [06/25/24 0019]   /89   Pulse 98   Resp 19   Temp 97.7 °F (36.5 °C)   Temp src Temporal   SpO2 98 %   O2 Device None (Room air)       Current Vitals:   Vital Signs  BP: 121/57  Pulse: 84  Resp: 19  Temp: 97.7 °F (36.5 °C)  Temp src: Temporal  MAP (mmHg): 75    Oxygen Therapy  SpO2: 98 %  O2 Device: None (Room air)            Physical Exam  Vitals and nursing note reviewed.   Constitutional:       General: She is not in acute distress.     Appearance: She is well-developed. She is not ill-appearing or diaphoretic.   HENT:      Head: Normocephalic and atraumatic.   Eyes:      Conjunctiva/sclera: Conjunctivae normal.      Pupils: Pupils are equal, round, and reactive to light.   Neck:        Comments: No active bleeding or oozing  Cardiovascular:      Rate and Rhythm: Normal rate.   Pulmonary:      Effort: Pulmonary effort is normal. No tachypnea or respiratory distress.      Breath sounds: No stridor or transmitted upper airway sounds.   Musculoskeletal:         General: No deformity. Normal range of motion.      Cervical back: Normal range of motion and neck supple.   Skin:     General: Skin is warm and dry.      Findings: No rash.   Neurological:      Mental Status: She is alert and oriented to person, place, and time.      Coordination: Coordination normal.   Psychiatric:         Attention and Perception: Attention normal.         Mood and Affect: Mood is anxious.         Behavior: Behavior normal. Behavior is cooperative.           ED Course   Labs Reviewed - No data to display           MDM      Pulse Ox: 98%, Normal, RA    Medications - No data to display    Patient shows pictures of oozing on her shirt, on the floor at home, seems most consistent with a serosanguineous drainage and not gia blood, no wound  dehiscence.  Currently there is no oozing or bleeding from the wound just dried drainage on the bandage.  Wound and Steri-Strips otherwise in place and clean.  Patient is very hesitant to have us do anything with the wound.  I advised her that we will replace the overlying gauze/bandage, as there is no drainage at this time and no bleeding will continue to monitor without any further intervention.  She will contact her ENT tomorrow to notify them of her status overnight, return for any recurrent issues.        Disposition and Plan     Clinical Impression:  1. Encounter for post surgical wound check         Disposition:  Discharge  6/25/2024  1:56 am    Follow-up:  Melinda Jaramillo MD  [address] [place], [place] [id number] [phone]    Call  call with update on your visit and symptoms    We recommend that you schedule follow up care with a primary care provider within the next three months to obtain basic health screening including reassessment of your blood pressure.      Medications Prescribed:  Discharge Medication List as of 6/25/2024  2:11 AM

## 2024-06-27 ENCOUNTER — OFFICE VISIT (OUTPATIENT)
Dept: OTOLARYNGOLOGY | Facility: CLINIC | Age: 69
End: 2024-06-27

## 2024-06-27 VITALS — BODY MASS INDEX: 18.05 KG/M2 | HEIGHT: 67 IN | WEIGHT: 115 LBS

## 2024-06-27 DIAGNOSIS — Q89.2 THYROGLOSSAL DUCT CYST: Primary | ICD-10-CM

## 2024-06-27 DIAGNOSIS — T81.30XA WOUND DEHISCENCE: ICD-10-CM

## 2024-06-27 PROCEDURE — 99024 POSTOP FOLLOW-UP VISIT: CPT | Performed by: SPECIALIST

## 2024-06-27 NOTE — PROGRESS NOTES
Postoperative day #10  Patient status post excision of thyroglossal duct cyst.  Reports some drainage.  Physical examination:  Neck dressing removed and cleaned.  Packing replaced.  Area is looking clean.  I had the patient drink a red liquid to see whether there was any extravasation through the wound and there was none.  Impression: Delayed wound healing in this thin lady.  I have encouraged her to eat more protein to improve upon the wound healing.  Plan: Patient to follow-up with Dr. Covarrubias on Saturday and follow-up with me on Monday.  Wound will continue to pack the wound open.

## 2024-06-27 NOTE — PATIENT INSTRUCTIONS
You very likely had a seroma with an opening into the skin.  The area was packed.  I asked you to follow-up in 1 days time, sooner if problems.  I do believe that nutrition may be part of this issue and I have encouraged you to try to eat something a little more solid.

## 2024-06-27 NOTE — PROGRESS NOTES
Postoperative day #9  Patient's status post thyroglossal duct cyst removal.  Complaining of some drainage from her neck on Monday.  Physical examination:  Steri-Strips removed and small midline defect in the suture line.  Underlying cavity.  I did observe the patient drink water to make sure that there was no fistula.  No drainage was observed.  The wound was packed with iodoform gauze and redressed.  Impression: Wound dehiscence.  Plan: Open packing of the wound.  I did encourage the patient to improve her nutrition to promote healing.  No obvious fistula noted.  Follow-up in 1 days time, sooner if problems.

## 2024-06-29 ENCOUNTER — OFFICE VISIT (OUTPATIENT)
Dept: OTOLARYNGOLOGY | Facility: CLINIC | Age: 69
End: 2024-06-29

## 2024-06-29 DIAGNOSIS — Q89.2 THYROGLOSSAL DUCT CYST: Primary | ICD-10-CM

## 2024-06-29 PROCEDURE — 99024 POSTOP FOLLOW-UP VISIT: CPT | Performed by: OTOLARYNGOLOGY

## 2024-06-29 NOTE — PROGRESS NOTES
Leonela Faria is a 68 year old female.    Chief Complaint   Patient presents with    Post-Op     Patient is here due to Thyroglossal duct cyst excision, excision and closure of neck sebaceous cyst post op        HISTORY OF PRESENT ILLNESS  Status post excision of thyroglossal duct cyst and overlying skin cyst.  She developed a postoperative fistula.  Here to have her packed changed.  No complaints or concerns no pain or discomfort having some issues with swallowing and eating puréed meats and baby food.      Social History     Socioeconomic History    Marital status:    Tobacco Use    Smoking status: Never    Smokeless tobacco: Never   Vaping Use    Vaping status: Never Used   Substance and Sexual Activity    Alcohol use: Never    Drug use: Never       History reviewed. No pertinent family history.    Past Medical History:    Anxiety    Disorder of thyroid    History of stomach ulcers    IBS (irritable bowel syndrome)    Migraines    after surgery migraines    PONV (postoperative nausea and vomiting)       Past Surgical History:   Procedure Laterality Date    Cholecystectomy      Colonoscopy  2000, 7/16    Egd  04/2019    mild erosive gastritis    Other surgical history  06/17/2024    Excision of sebaceous cyst and thyroglossal duct cyst    Upper gi endoscopy,exam           REVIEW OF SYSTEMS    System Neg/Pos Details   Constitutional Negative Fatigue, fever and weight loss.   ENMT Negative Drooling.   Eyes Negative Blurred vision and vision changes.   Respiratory Negative Dyspnea and wheezing.   Cardio Negative Chest pain, irregular heartbeat/palpitations and syncope.   GI Negative Abdominal pain and diarrhea.   Endocrine Negative Cold intolerance and heat intolerance.   Neuro Negative Tremors.   Psych Negative Anxiety and depression.   Integumentary Negative Frequent skin infections, pigment change and rash.   Hema/Lymph Negative Easy bleeding and easy bruising.           PHYSICAL EXAM    There were no  vitals taken for this visit.       Constitutional Normal Overall appearance - Normal.   Psychiatric Normal Orientation - Oriented to time, place, person & situation. Appropriate mood and affect.   Neck Exam Normal Inspection -subcutaneous cavity palpation - Normal. Parotid gland - Normal. Thyroid gland - Normal.   Eyes Normal Conjunctiva - Right: Normal, Left: Normal. Pupil - Right: Normal, Left: Normal. Fundus - Right: Normal, Left: Normal.   Neurological Normal Memory - Normal. Cranial nerves - Cranial nerves II through XII grossly intact.   Head/Face Normal Facial features - Normal. Eyebrows - Normal. Skull - Normal.        Nasopharynx Normal External nose - Normal. Lips/teeth/gums - Normal. Tonsils - Normal. Oropharynx - Normal.   Ears Normal Inspection - Right: Normal, Left: Normal. Canal - Right: Normal, Left: Normal. TM - Right: Normal, Left: Normal.   Skin Normal Inspection - Normal.        Lymph Detail Normal Submental. Submandibular. Anterior cervical. Posterior cervical. Supraclavicular.        Nose/Mouth/Throat Normal External nose - Normal. Lips/teeth/gums - Normal. Tonsils - Normal. Oropharynx - Normal.   Nose/Mouth/Throat Normal Nares - Right: Normal Left: Normal. Septum -Normal  Turbinates - Right: Normal, Left: Normal.       Current Outpatient Medications:     pantoprazole 40 MG Oral Tab EC, Take 1 tablet (40 mg total) by mouth daily., Disp: , Rfl:     sulfamethoxazole-trimethoprim -160 MG Oral Tab per tablet, Take 1 tablet by mouth 2 (two) times daily for 7 days., Disp: 14 tablet, Rfl: 0    traMADol 50 MG Oral Tab, Take 1 tablet (50 mg total) by mouth every 6 (six) hours as needed for Pain., Disp: 15 tablet, Rfl: 0    mupirocin 2 % External Ointment, Apply 1 Application topically 2 (two) times daily for 10 days., Disp: 15 g, Rfl: 0    cephalexin 500 MG Oral Cap, Take 1 capsule (500 mg total) by mouth every 8 (eight) hours., Disp: 21 capsule, Rfl: 0    ondansetron (ZOFRAN) 4 mg tablet, Take 1  tablet (4 mg total) by mouth every 8 (eight) hours as needed for Nausea., Disp: 6 tablet, Rfl: 0    UNITHROID 50 MCG Oral Tab, Take 1 tablet (50 mcg total) by mouth before breakfast., Disp: 90 tablet, Rfl: 0  ASSESSMENT AND PLAN    1. Thyroglossal duct cyst  Doing well she has a small cavity subcutaneously I did remove her old packing and replace it with a shorter length of iodoform gauze.  I did Discuss her situation at length with her and her  I did recommend she follow-up with Dr. Jaramillo.  Continue Bactrim for now.        This note was prepared using Dragon [employer] voice recognition dictation software. As a result errors may occur. When identified these errors have been corrected. While every attempt is made to correct errors during dictation discrepancies may still exist    Aayush Covarrubias MD    6/29/2024    10:51 AM

## 2024-07-01 ENCOUNTER — OFFICE VISIT (OUTPATIENT)
Dept: OTOLARYNGOLOGY | Facility: CLINIC | Age: 69
End: 2024-07-01

## 2024-07-01 VITALS — WEIGHT: 115 LBS | HEIGHT: 67 IN | BODY MASS INDEX: 18.05 KG/M2

## 2024-07-01 DIAGNOSIS — Q89.2 THYROGLOSSAL DUCT CYST: Primary | ICD-10-CM

## 2024-07-01 DIAGNOSIS — T81.30XA WOUND DEHISCENCE: ICD-10-CM

## 2024-07-01 PROCEDURE — 99024 POSTOP FOLLOW-UP VISIT: CPT | Performed by: SPECIALIST

## 2024-07-01 NOTE — PROGRESS NOTES
Post operative day #14  That is post removal of thyroglossal duct cyst.  Physical examination:  Neck: Persistent midline defect.  Repacked.  Wound healing in nicely.  Impression: Granulating in nicely.  Plan: Finish Bactrim.  Continue efforts to increase nutrition.  Follow-up in 2 days time, sooner if problems.

## 2024-07-03 ENCOUNTER — OFFICE VISIT (OUTPATIENT)
Dept: OTOLARYNGOLOGY | Facility: CLINIC | Age: 69
End: 2024-07-03

## 2024-07-03 VITALS — HEIGHT: 67 IN | BODY MASS INDEX: 18.05 KG/M2 | WEIGHT: 115 LBS

## 2024-07-03 DIAGNOSIS — Q89.2 THYROGLOSSAL DUCT CYST: Primary | ICD-10-CM

## 2024-07-03 DIAGNOSIS — T81.30XA WOUND DEHISCENCE: ICD-10-CM

## 2024-07-03 PROCEDURE — 99024 POSTOP FOLLOW-UP VISIT: CPT | Performed by: SPECIALIST

## 2024-07-03 NOTE — PROGRESS NOTES
Postoperative day #16  Patient's status post removal of thyroglossal duct cyst.  No complaints.  Physical examination: Wound continues to heal and slowly.  Area repacked.  Impression: Dehiscence after removal of thyroglossal duct cyst.  Plan: Continue local care to the wound.  Follow-up in 2 days time, sooner if problems.

## 2024-07-03 NOTE — PATIENT INSTRUCTIONS
You continue to heal the wound and slowly.  Please continue your increased efforts at improving your nutrition.  Follow-up in 2 days time, sooner if problems.

## 2024-07-05 ENCOUNTER — OFFICE VISIT (OUTPATIENT)
Dept: OTOLARYNGOLOGY | Facility: CLINIC | Age: 69
End: 2024-07-05

## 2024-07-05 VITALS — BODY MASS INDEX: 18.05 KG/M2 | WEIGHT: 115 LBS | HEIGHT: 67 IN

## 2024-07-05 DIAGNOSIS — T81.30XA WOUND DEHISCENCE: ICD-10-CM

## 2024-07-05 DIAGNOSIS — Q89.2 THYROGLOSSAL DUCT CYST: Primary | ICD-10-CM

## 2024-07-05 PROCEDURE — 99024 POSTOP FOLLOW-UP VISIT: CPT | Performed by: SPECIALIST

## 2024-07-05 RX ORDER — SULFAMETHOXAZOLE AND TRIMETHOPRIM 800; 160 MG/1; MG/1
1 TABLET ORAL EVERY 12 HOURS
Qty: 14 TABLET | Refills: 0 | Status: SHIPPED | OUTPATIENT
Start: 2024-07-05

## 2024-07-05 NOTE — PROGRESS NOTES
Postoperative day #18  Patient's status post removal of thyroglossal duct cyst.  No complaints.  Physical examination: Wound continues to heal in.  No erythema.  Area repacked.  Impression: Dehiscence after removal of thyroglossal duct cyst beginning to heal in nicely.  Plan: Follow-up on Monday.  Patient can call sooner if there is any questions.  I have refilled the Bactrim for her, however I suggest that she discontinue it as she is having some GI upset and diarrhea.

## 2024-07-05 NOTE — PATIENT INSTRUCTIONS
Your wound continues to heal in nicely.  I will send in a refill of the Bactrim but please hold off on it for now.  Follow-up on Monday at 1030, call sooner if problems.

## 2024-07-08 ENCOUNTER — OFFICE VISIT (OUTPATIENT)
Dept: OTOLARYNGOLOGY | Facility: CLINIC | Age: 69
End: 2024-07-08

## 2024-07-08 DIAGNOSIS — Q89.2 THYROGLOSSAL DUCT CYST: Primary | ICD-10-CM

## 2024-07-08 DIAGNOSIS — T81.30XA WOUND DEHISCENCE: ICD-10-CM

## 2024-07-08 PROCEDURE — 99024 POSTOP FOLLOW-UP VISIT: CPT | Performed by: SPECIALIST

## 2024-07-09 NOTE — PROGRESS NOTES
Operative day #21.  Patient with diarrhea has not had antibiotics for the past couple of days.  Physical examination:  Packing removed and wound cleaned.  Healing is about 60%.  Impression: Continues to heal.  No evidence of infection.  Plan: Stay off of antibiotics for now.  Follow-up in 2 days time, sooner if problems.

## 2024-07-09 NOTE — PATIENT INSTRUCTIONS
Your wound is about 60 and healed.  Continue to increase your oral intake.  Follow-up in 2 days time, sooner if problems.  Please do not restart antibiotics as you are having diarrhea.

## 2024-07-10 ENCOUNTER — OFFICE VISIT (OUTPATIENT)
Dept: OTOLARYNGOLOGY | Facility: CLINIC | Age: 69
End: 2024-07-10

## 2024-07-10 DIAGNOSIS — T81.30XA WOUND DEHISCENCE: ICD-10-CM

## 2024-07-10 DIAGNOSIS — Q89.2 THYROGLOSSAL DUCT CYST: Primary | ICD-10-CM

## 2024-07-10 PROCEDURE — 99024 POSTOP FOLLOW-UP VISIT: CPT | Performed by: SPECIALIST

## 2024-07-11 NOTE — PROGRESS NOTES
Postoperative day #23  Patient still reports diarrhea.  Physical examination:  Neck: Area cleaned and repacked.  Now about 80% healed.  No evidence of erythema.  Impression: Healing nicely.  Patient reports diarrhea.  She is not sure whether it is getting better or not.  I have encouraged her to eat a bland diet and to keep track of her diarrhea so that we can see whether her stool sample should be sent for C. difficile.  Follow-up in 2 days time, sooner if problems.

## 2024-07-11 NOTE — PATIENT INSTRUCTIONS
Your wound is now about 80% healed.  It was cleaned and repacked.  Follow-up in 2 days time, sooner if problems.  Please keep track of your diarrhea so that we can see whether this is improving or not.  Try to eat foods like bananas, rice, toast to improve your digestion.

## 2024-07-12 ENCOUNTER — OFFICE VISIT (OUTPATIENT)
Dept: OTOLARYNGOLOGY | Facility: CLINIC | Age: 69
End: 2024-07-12

## 2024-07-12 VITALS — HEIGHT: 67 IN | WEIGHT: 115 LBS | BODY MASS INDEX: 18.05 KG/M2

## 2024-07-12 DIAGNOSIS — T81.30XA WOUND DEHISCENCE: ICD-10-CM

## 2024-07-12 DIAGNOSIS — Q89.2 THYROGLOSSAL DUCT CYST: Primary | ICD-10-CM

## 2024-07-12 PROCEDURE — 99024 POSTOP FOLLOW-UP VISIT: CPT | Performed by: SPECIALIST

## 2024-07-13 NOTE — PROGRESS NOTES
Postoperative day #25  Patient's status post thyroglossal duct cyst removal.  Stool is more formed.  Physical examination:  Area cleaned and packing removed.  Area is about 95% healed.  Small amount of granulation tissue on the skin edge.  No drainage.  Area cleaned and repacked.  Impression: 90 to 95% healed.  Diarrhea overall improving.  Plan: Follow-up on Monday.  Continue bland diet.     I have a steady place to live

## 2024-07-15 ENCOUNTER — OFFICE VISIT (OUTPATIENT)
Dept: OTOLARYNGOLOGY | Facility: CLINIC | Age: 69
End: 2024-07-15

## 2024-07-15 VITALS — BODY MASS INDEX: 18.05 KG/M2 | WEIGHT: 115 LBS | HEIGHT: 67 IN

## 2024-07-15 DIAGNOSIS — T81.30XA WOUND DEHISCENCE: ICD-10-CM

## 2024-07-15 DIAGNOSIS — Q89.2 THYROGLOSSAL DUCT CYST: Primary | ICD-10-CM

## 2024-07-15 PROCEDURE — 99024 POSTOP FOLLOW-UP VISIT: CPT | Performed by: SPECIALIST

## 2024-07-16 NOTE — PROGRESS NOTES
Operative day #28  Patient status post removal of thyroglossal duct cyst.  Diarrhea improving.  Physical examination:  Neck: Packing pushed from wound as wound is healing.  Defect now only able to fit about one half of the tip of a cotton swab.  Area cleaned with down iodine.  Dressing replaced.  Impression: Healing nicely.  Plan: Follow-up in 2 days time, sooner if problems.

## 2024-07-16 NOTE — PATIENT INSTRUCTIONS
Your wound is now 95% healed.  No additional packing however the wound was cleaned and redressed.  Follow-up on Wednesday, sooner if problems.

## 2024-07-17 ENCOUNTER — OFFICE VISIT (OUTPATIENT)
Dept: OTOLARYNGOLOGY | Facility: CLINIC | Age: 69
End: 2024-07-17

## 2024-07-17 DIAGNOSIS — Q89.2 THYROGLOSSAL DUCT CYST: Primary | ICD-10-CM

## 2024-07-17 DIAGNOSIS — T81.30XA WOUND DEHISCENCE: ICD-10-CM

## 2024-07-17 PROCEDURE — 99024 POSTOP FOLLOW-UP VISIT: CPT | Performed by: SPECIALIST

## 2024-07-18 NOTE — PROGRESS NOTES
Postoperative day #30  Patient's status post thyroglossal duct cyst excision.  Reports that her bowel has pretty much recovered.  Physical examination:  Neck: Small amount of granulation tissue at the dehiscence site.  Only able to put about one half of a cotton swab into the area.  Area cleaned with Betadine solution.  Impression: Healing nicely.  Almost completely healed.  Still slight drainage.  Plan: Redressed.  Follow-up on Friday, sooner if problems.

## 2024-07-18 NOTE — PATIENT INSTRUCTIONS
Your wound continues to heal.  The area was redressed.  Follow-up in 2 days time, sooner if problems.

## 2024-07-19 ENCOUNTER — OFFICE VISIT (OUTPATIENT)
Dept: OTOLARYNGOLOGY | Facility: CLINIC | Age: 69
End: 2024-07-19

## 2024-07-19 VITALS — BODY MASS INDEX: 18.05 KG/M2 | HEIGHT: 67 IN | WEIGHT: 115 LBS

## 2024-07-19 DIAGNOSIS — T81.30XA WOUND DEHISCENCE: ICD-10-CM

## 2024-07-19 DIAGNOSIS — Q89.2 THYROGLOSSAL DUCT CYST: Primary | ICD-10-CM

## 2024-07-19 PROCEDURE — 99024 POSTOP FOLLOW-UP VISIT: CPT | Performed by: SPECIALIST

## 2024-07-19 NOTE — PROGRESS NOTES
1 = Total assistance Post operative day #32  Diarrhea has stabilized  Patient continues to advance diet  Physical exam:  Very small amount of granulation in the middle of the wound.  Area was cleaned and redressed  Impression: near complete healing  Plan: follow up on Monday, sooner if problems.

## 2024-07-22 ENCOUNTER — OFFICE VISIT (OUTPATIENT)
Dept: OTOLARYNGOLOGY | Facility: CLINIC | Age: 69
End: 2024-07-22

## 2024-07-22 VITALS — BODY MASS INDEX: 18.05 KG/M2 | WEIGHT: 115 LBS | HEIGHT: 67 IN

## 2024-07-22 DIAGNOSIS — Q89.2 THYROGLOSSAL DUCT CYST: Primary | ICD-10-CM

## 2024-07-22 DIAGNOSIS — T81.30XA WOUND DEHISCENCE: ICD-10-CM

## 2024-07-22 PROCEDURE — 99024 POSTOP FOLLOW-UP VISIT: CPT | Performed by: SPECIALIST

## 2024-07-22 NOTE — PROGRESS NOTES
Postoperative day #35  Patient's status post removal of thyroglossal duct cyst.  Bowel has now normalized.  Patient reports feeling chilled and generally tired.  Physical examination:  Ears: Normal  Nose: No purulence.  Mouth: Normal no evidence of infection.  Larynx: Small amount of retained secretions in the vallecula.  No infection noted.  Remainder of larynx normal.  Very tiny amount of granulation tissue in the middle of the wound.  The remainder of the wound is completely healed and without erythema.  Area was cleaned with Betadine ointment and redressed.  Impression: Almost completely healed.  No evidence of infection.  Plan: Patient is starting to tolerate a general diet.  Continue the same.  Follow-up on Wednesday, sooner if problems.

## 2024-07-22 NOTE — PATIENT INSTRUCTIONS
Small amount of granulation tissue in the very small area that is not quite yet healed.  Continue to try to

## 2024-07-24 ENCOUNTER — OFFICE VISIT (OUTPATIENT)
Dept: OTOLARYNGOLOGY | Facility: CLINIC | Age: 69
End: 2024-07-24

## 2024-07-24 VITALS — HEIGHT: 67 IN | WEIGHT: 115 LBS | BODY MASS INDEX: 18.05 KG/M2

## 2024-07-24 DIAGNOSIS — T81.30XA WOUND DEHISCENCE: ICD-10-CM

## 2024-07-24 DIAGNOSIS — Q89.2 THYROGLOSSAL DUCT CYST: Primary | ICD-10-CM

## 2024-07-24 PROCEDURE — 99024 POSTOP FOLLOW-UP VISIT: CPT | Performed by: SPECIALIST

## 2024-07-25 NOTE — PATIENT INSTRUCTIONS
Area is 99% healed.  It was cleaned and redressed.  Continue to try to improve your nutrition.  Follow-up in 2 days time, sooner if problems.

## 2024-07-25 NOTE — PROGRESS NOTES
Postoperative day #37  Patient's status post thyroglossal duct cyst removal.  Reports some sore throat and hoarseness.  Physical examination:  Mouth: Minimal erythema no exudate.  Neck: Wound healed except for the very central location which has a 2 to 3 mm opening with slight granulation tissue.  This area was cleaned and redressed.  Impression: At least 99% healed.  Plan: Follow-up in 2 days time, sooner if problems.

## 2024-07-26 ENCOUNTER — OFFICE VISIT (OUTPATIENT)
Dept: OTOLARYNGOLOGY | Facility: CLINIC | Age: 69
End: 2024-07-26

## 2024-07-26 DIAGNOSIS — Q89.2 THYROGLOSSAL DUCT CYST: Primary | ICD-10-CM

## 2024-07-26 DIAGNOSIS — T81.30XA WOUND DEHISCENCE: ICD-10-CM

## 2024-07-26 PROCEDURE — 99024 POSTOP FOLLOW-UP VISIT: CPT | Performed by: SPECIALIST

## 2024-07-27 NOTE — PATIENT INSTRUCTIONS
Your wound is now 99% healed.  Continue to advance diet as tolerated.  No evidence of laryngeal abnormality on the day to your visit.  Follow-up on Monday, sooner if problems.

## 2024-07-27 NOTE — PROGRESS NOTES
Postoperative day #39  Patient's status post excision of thyroglossal duct cyst.  Reports that her voice sometimes seems hoarse.  Physical examination:  Over 99% healing of wound.  No drainage.  Larynx: Normal vocal cord mobility.  No retained secretions.  Impression: Almost completely healed.  Plan: Follow-up on Monday, advance diet as tolerated.

## 2024-07-29 ENCOUNTER — OFFICE VISIT (OUTPATIENT)
Dept: OTOLARYNGOLOGY | Facility: CLINIC | Age: 69
End: 2024-07-29

## 2024-07-29 DIAGNOSIS — Q89.2 THYROGLOSSAL DUCT CYST: ICD-10-CM

## 2024-07-29 DIAGNOSIS — R49.0 HOARSENESS: Primary | ICD-10-CM

## 2024-07-29 DIAGNOSIS — T81.30XA WOUND DEHISCENCE: ICD-10-CM

## 2024-07-30 ENCOUNTER — OFFICE VISIT (OUTPATIENT)
Dept: OTOLARYNGOLOGY | Facility: CLINIC | Age: 69
End: 2024-07-30

## 2024-07-30 DIAGNOSIS — H61.23 CERUMEN DEBRIS ON TYMPANIC MEMBRANE OF BOTH EARS: Primary | ICD-10-CM

## 2024-07-30 PROCEDURE — 69210 REMOVE IMPACTED EAR WAX UNI: CPT | Performed by: SPECIALIST

## 2024-07-30 NOTE — PATIENT INSTRUCTIONS
Your fiberoptic laryngoscopy was within normal limits.  Normal vocal cord mobility no retained secretions.  The swallowing mechanism also looked normal.  Neck 99.9% healed.  Follow-up on Thursday to get the wound rechecked, sooner if problems.

## 2024-07-30 NOTE — PROGRESS NOTES
Postoperative day #42.  Patient status post excision of thyroglossal duct cyst.  Complains of hoarseness.  Physical examination:  99.9% healing of her wound.  No drainage.  Area cleaned.  Larynx:Consent was obtained for the procedure.  The nose was asesthetized with 1% neosynephrine and 4% lidocaine topical drops.    The scope was placed into the bilateral nares as well as the nasopharynx, hypopharynx and larynx.    All of which were examined.  The  entire larynx including the vallecula, epiglottis, true and false vocal cords, aryepiglottic folds, piriform sinuses and post cricord area were examined for tumors and infectious processes as well as for evidence of reflux and retained secretions.  Vocal cord mobility was also assessed.    Any abnormalities are noted in the exam section.  No retained secretions.  Normal vocal cord mobility.  No tumors or masses seen.  No erythema.  Impression almost completely healed.  Although patient states she is intermittently hoarse, no laryngeal abnormalities noted on fiberoptic scope.  Follow-up in 3 days time, sooner if problems.  No need to put further dressings on the neck.

## 2024-08-01 ENCOUNTER — OFFICE VISIT (OUTPATIENT)
Dept: OTOLARYNGOLOGY | Facility: CLINIC | Age: 69
End: 2024-08-01

## 2024-08-01 VITALS — BODY MASS INDEX: 18.05 KG/M2 | WEIGHT: 115 LBS | HEIGHT: 67 IN

## 2024-08-01 DIAGNOSIS — Q89.2 THYROGLOSSAL DUCT CYST: Primary | ICD-10-CM

## 2024-08-01 DIAGNOSIS — T81.30XA WOUND DEHISCENCE: ICD-10-CM

## 2024-08-01 PROCEDURE — 99024 POSTOP FOLLOW-UP VISIT: CPT | Performed by: SPECIALIST

## 2024-08-02 NOTE — PROGRESS NOTES
Postoperative day #45  No complaints.  Physical examination:  Neck: 100% healing of neck incision.  No erythema.  Impression: Patient continues to feel somewhat hoarse intermittently.  She also is not back to her general diet.  Plan: Continue to advance diet as tolerated.  Can consider speech therapy if voice continues to be an issue.  It sounds for the most part normal to me.  Follow-up in 2 weeks time, sooner if problems.

## 2024-08-02 NOTE — PATIENT INSTRUCTIONS
Pack is 100% healed.  Continue to advance diet as tolerated.  No precautions needed for the incision.  Okay to use vitamin EE.  Follow-up in 2 weeks time, sooner if problems.

## 2024-08-16 ENCOUNTER — OFFICE VISIT (OUTPATIENT)
Dept: OTOLARYNGOLOGY | Facility: CLINIC | Age: 69
End: 2024-08-16

## 2024-08-16 VITALS — HEIGHT: 67 IN | BODY MASS INDEX: 18.05 KG/M2 | WEIGHT: 115 LBS

## 2024-08-16 DIAGNOSIS — R13.12 OROPHARYNGEAL DYSPHAGIA: ICD-10-CM

## 2024-08-16 DIAGNOSIS — Q89.2 THYROGLOSSAL DUCT CYST: Primary | ICD-10-CM

## 2024-08-16 DIAGNOSIS — R49.0 HOARSENESS: ICD-10-CM

## 2024-08-16 PROCEDURE — 99213 OFFICE O/P EST LOW 20 MIN: CPT | Performed by: SPECIALIST

## 2024-08-16 PROCEDURE — 31575 DIAGNOSTIC LARYNGOSCOPY: CPT | Performed by: SPECIALIST

## 2024-08-16 NOTE — PROGRESS NOTES
Leonela Faria is a 68 year old female.   Chief Complaint   Patient presents with    Post-Op     thyroglossal duct cyst     HPI:   Patient here for follow-up on thyroglossal duct excision.  Is postop day #61.  Although diet is improving, she still cannot eat solids, she also reports intermittent hoarseness.    Current Outpatient Medications   Medication Sig Dispense Refill    traMADol 50 MG Oral Tab Take 1 tablet (50 mg total) by mouth every 6 (six) hours as needed for Pain. 15 tablet 0    UNITHROID 50 MCG Oral Tab Take 1 tablet (50 mcg total) by mouth before breakfast. 90 tablet 0    sulfamethoxazole-trimethoprim -160 MG Oral Tab per tablet Take 1 tablet by mouth every 12 (twelve) hours. (Patient not taking: Reported on 7/26/2024) 14 tablet 0    cephalexin 500 MG Oral Cap Take 1 capsule (500 mg total) by mouth every 8 (eight) hours. (Patient not taking: Reported on 7/26/2024) 21 capsule 0    ondansetron (ZOFRAN) 4 mg tablet Take 1 tablet (4 mg total) by mouth every 8 (eight) hours as needed for Nausea. (Patient not taking: Reported on 7/26/2024) 6 tablet 0      Past Medical History:    Anxiety    Disorder of thyroid    History of stomach ulcers    IBS (irritable bowel syndrome)    Migraines    after surgery migraines    PONV (postoperative nausea and vomiting)      Social History:  Social History     Socioeconomic History    Marital status:    Tobacco Use    Smoking status: Never    Smokeless tobacco: Never   Vaping Use    Vaping status: Never Used   Substance and Sexual Activity    Alcohol use: Never    Drug use: Never     Social Determinants of Health     Food Insecurity: No Food Insecurity (6/17/2024)    Food Insecurity     Food Insecurity: Never true   Transportation Needs: No Transportation Needs (6/17/2024)    Transportation Needs     Lack of Transportation: No   Housing Stability: Low Risk  (6/17/2024)    Housing Stability     Housing Instability: No        REVIEW OF SYSTEMS:   GENERAL  HEALTH: feels well otherwise  GENERAL : denies fever, chills, sweats, weight loss, weight gain  SKIN: denies any unusual skin lesions or rashes  RESPIRATORY: denies shortness of breath with exertion  NEURO: denies headaches    EXAM:   Ht 5' 7\" (1.702 m)   Wt 115 lb (52.2 kg)   BMI 18.01 kg/m²   System Details   Skin Inspection - Normal.   Constitutional Overall appearance - Normal.   Head/Face Facial features - Normal. Eyebrows - Normal. Skull - Normal.   Eyes Conjunctiva - Right: Normal, Left: Normal. Pupil - Right: Normal, Left: Normal.    Ears Inspection - Right: Normal, Left: Normal.   Canal - Right: Normal, Left: Normal.    TM - Right: Normal, Left: Normal.   Nasal External nose - Normal.   Nasal septum - Normal.  Turbinates - Normal   Oral/Oropharynx Lips - Normal, Tonsils - Normal, Tongue - Normal    Neck Exam Inspection - Normal. Palpation - Normal. Parotid gland - Normal. Thyroid gland - Normal.  Well-healed scar, no evidence of erythema.   Lymph Detail Submental. Submandibular. Anterior cervical. Posterior cervical. Supraclavicular.  All without enlargement   Psychiatric Orientation - Oriented to time, place, person & situation. Appropriate mood and affect.   Neurological Memory - Normal. Cranial nerves - Cranial nerves II through XII grossly intact.   Nasopharynx Mucoid postnasal drainage otherwise no abnormalities by fiberoptic exam   Larynx Consent was obtained for the procedure.  The nose was asesthetized with 1% neosynephrine and 4% lidocaine topical drops.    The scope was placed into the bilateral nares as well as the nasopharynx, hypopharynx and larynx.    All of which were examined.  The  entire larynx including the vallecula, epiglottis, true and false vocal cords, aryepiglottic folds, piriform sinuses and post cricord area were examined for tumors and infectious processes as well as for evidence of reflux and retained secretions.  Vocal cord mobility was also assessed.    Any abnormalities are  noted in the exam section.  Some mucus over the larynx, otherwise normal.  No retained secretions.  No tumors or masses seen.  Normal vocal cord mobility.  No significant erythema.     ASSESSMENT AND PLAN:   1. Thyroglossal duct cyst  Well-healed.  Patient still with some oropharyngeal dysphagia and intermittent hoarseness.  Sometimes this can be dehydration.  Increase fluid intake as tolerated.    2. Hoarseness  Laryngeal abnormalities other than a mucoid postnasal drainage.    3. Oropharyngeal dysphagia  Advance diet as tolerated.  Follow-up in 3 weeks time, sooner if problems.      The patient indicates understanding of these issues and agrees to the plan.      Melinda Jaramillo MD  8/16/2024  12:35 PM

## 2024-08-16 NOTE — PATIENT INSTRUCTIONS
Your wound remains healed without any evidence of erythema.  A fiberoptic scope was done to check for your hoarseness and oropharyngeal dysphagia.  There were no retained secretions.  There was some mucoid postnasal drainage, this can be due to dehydration.  Try to increase your fluids.  Increase your diet as tolerated.  Follow-up in 3 weeks time, sooner if problems.

## 2024-08-20 ENCOUNTER — TELEPHONE (OUTPATIENT)
Dept: OTOLARYNGOLOGY | Facility: CLINIC | Age: 69
End: 2024-08-20

## 2024-08-20 NOTE — TELEPHONE ENCOUNTER
Patient is requesting to stop in the [place] office today to  the part of the report of surgery that talks about the hole that was in the middle of the incision, and was treated as a wound for several weeks afterwards. Patient states she has the rest of the report and only needs this one section. Please call.

## 2024-08-20 NOTE — TELEPHONE ENCOUNTER
Leonela is requesting a copy of the ED notes from 6/25/24 and ENT office note from 6/26/24, her  can pick them up at the  in the [place] office.     Lary VÁSQUEZ printed out the requested office notes and they are at the  for Leonela's  to , she's aware and appreciative.

## 2024-09-06 ENCOUNTER — OFFICE VISIT (OUTPATIENT)
Dept: OTOLARYNGOLOGY | Facility: CLINIC | Age: 69
End: 2024-09-06

## 2024-09-06 VITALS — BODY MASS INDEX: 18.21 KG/M2 | HEIGHT: 67 IN | WEIGHT: 116 LBS

## 2024-09-06 DIAGNOSIS — R13.12 OROPHARYNGEAL DYSPHAGIA: Primary | ICD-10-CM

## 2024-09-07 NOTE — PROGRESS NOTES
Leonela Faria is a 68 year old female.   Chief Complaint   Patient presents with    Follow - Up     Patient is here for thyroglossal duct cyst f/up.     HPI:   Patient here for follow-up after her thyroglossal duct cyst removal.  He has been about 82 days.  Still has occasional bowel issues.  Is swallowing better but not 100% of solids.    Current Outpatient Medications   Medication Sig Dispense Refill    UNITHROID 50 MCG Oral Tab Take 1 tablet (50 mcg total) by mouth before breakfast. 90 tablet 0    sulfamethoxazole-trimethoprim -160 MG Oral Tab per tablet Take 1 tablet by mouth every 12 (twelve) hours. (Patient not taking: Reported on 7/26/2024) 14 tablet 0    traMADol 50 MG Oral Tab Take 1 tablet (50 mg total) by mouth every 6 (six) hours as needed for Pain. (Patient not taking: Reported on 9/6/2024) 15 tablet 0    cephalexin 500 MG Oral Cap Take 1 capsule (500 mg total) by mouth every 8 (eight) hours. (Patient not taking: Reported on 7/26/2024) 21 capsule 0    ondansetron (ZOFRAN) 4 mg tablet Take 1 tablet (4 mg total) by mouth every 8 (eight) hours as needed for Nausea. (Patient not taking: Reported on 7/26/2024) 6 tablet 0      Past Medical History:    Anxiety    Disorder of thyroid    History of stomach ulcers    IBS (irritable bowel syndrome)    Migraines    after surgery migraines    PONV (postoperative nausea and vomiting)      Social History:  Social History     Socioeconomic History    Marital status:    Tobacco Use    Smoking status: Never    Smokeless tobacco: Never   Vaping Use    Vaping status: Never Used   Substance and Sexual Activity    Alcohol use: Never    Drug use: Never     Social Determinants of Health     Food Insecurity: No Food Insecurity (6/17/2024)    Food Insecurity     Food Insecurity: Never true   Transportation Needs: No Transportation Needs (6/17/2024)    Transportation Needs     Lack of Transportation: No   Housing Stability: Low Risk  (6/17/2024)    Housing  Stability     Housing Instability: No        REVIEW OF SYSTEMS:   GENERAL HEALTH: feels well otherwise  GENERAL : denies fever, chills, sweats, weight loss, weight gain  SKIN: denies any unusual skin lesions or rashes  RESPIRATORY: denies shortness of breath with exertion  NEURO: denies headaches    EXAM:   Ht 5' 7\" (1.702 m)   Wt 116 lb (52.6 kg)   BMI 18.17 kg/m²   System Details   Skin Inspection - Normal.   Constitutional Overall appearance - Normal.   Head/Face Facial features - Normal. Eyebrows - Normal. Skull - Normal.   Eyes Conjunctiva - Right: Normal, Left: Normal. Pupil - Right: Normal, Left: Normal.    Ears Inspection - Right: Normal, Left: Normal.   Canal - Right: Normal, Left: Normal.    TM - Right: Normal, Left: Normal.   Nasal External nose - Normal.   Nasal septum - Normal.  Turbinates - Normal   Oral/Oropharynx Lips - Normal, Tonsils - Normal, Tongue - Normal    Neck Exam Inspection - Normal. Palpation - Normal. Parotid gland - Normal. Thyroid gland - Normal.  Healed incision.  No evidence of erythema.   Lymph Detail Submental. Submandibular. Anterior cervical. Posterior cervical. Supraclavicular.  All without enlargement   Psychiatric Orientation - Oriented to time, place, person & situation. Appropriate mood and affect.   Neurological Memory - Normal. Cranial nerves - Cranial nerves II through XII grossly intact.   Nasopharynx Normal by fiberoptic exam   Larynx Consent was obtained for the procedure.  The nose was asesthetized with 1% neosynephrine and 4% lidocaine topical drops.    The scope was placed into the bilateral nares as well as the nasopharynx, hypopharynx and larynx.    All of which were examined.  The  entire larynx including the vallecula, epiglottis, true and false vocal cords, aryepiglottic folds, piriform sinuses and post cricord area were examined for tumors and infectious processes as well as for evidence of reflux and retained secretions.  Vocal cord mobility was also  assessed.    Any abnormalities are noted in the exam section.  Slight retained secretions especially in the area of the vallecula.  Able to clear with swallowing.  Normal vocal cord mobility.  No tumors or masses seen.  Slight posterior laryngeal erythema.     ASSESSMENT AND PLAN:   1. Oropharyngeal dysphagia  Persistent oropharyngeal dysphagia especially with dry or very solid foods.  A video swallow can be considered if this is persistent.  Patient would like to wait for now.  Follow-up in 1 month's time, sooner if problems.        The patient indicates understanding of these issues and agrees to the plan.      Melinda Jaramillo MD  9/6/2024  8:30 PM

## 2024-09-07 NOTE — PATIENT INSTRUCTIONS
Persistent difficulty swallowing solid foods.  This is however improving.  A video swallow can be considered if your symptoms persist.  Follow-up in 1 month's time, sooner if problems.

## 2024-09-27 ENCOUNTER — OFFICE VISIT (OUTPATIENT)
Dept: OTOLARYNGOLOGY | Facility: CLINIC | Age: 69
End: 2024-09-27

## 2024-09-27 ENCOUNTER — TELEPHONE (OUTPATIENT)
Dept: OTOLARYNGOLOGY | Facility: CLINIC | Age: 69
End: 2024-09-27

## 2024-09-27 VITALS — WEIGHT: 116 LBS | BODY MASS INDEX: 18.21 KG/M2 | HEIGHT: 67 IN

## 2024-09-27 DIAGNOSIS — M54.2 NECK PAIN: ICD-10-CM

## 2024-09-27 DIAGNOSIS — R09.82 POSTNASAL DRIP: ICD-10-CM

## 2024-09-27 DIAGNOSIS — R13.12 OROPHARYNGEAL DYSPHAGIA: Primary | ICD-10-CM

## 2024-09-27 PROCEDURE — 31575 DIAGNOSTIC LARYNGOSCOPY: CPT | Performed by: SPECIALIST

## 2024-09-27 PROCEDURE — 99213 OFFICE O/P EST LOW 20 MIN: CPT | Performed by: SPECIALIST

## 2024-09-27 NOTE — TELEPHONE ENCOUNTER
Per patient having soreness behind her incision site and asking if it can be checked. No appointment. Please advise

## 2024-09-27 NOTE — TELEPHONE ENCOUNTER
Contacted patient, is having soreness behind healed incision from thyroglossal duct cyst  excision (6/17/24),  Has internal whole neck soreness, no outward redness, drainage or swelling to neck, felt chilled, no fever (although body temperature runs low), would like to be seen in office, no appt available.    , please see message and advise.

## 2024-09-27 NOTE — TELEPHONE ENCOUNTER
Patient to be seen today for evaluation  Future Appointments   Date Time Provider Department Center   9/27/2024 11:40 AM Clint, Melinda M, MD [facility] [place]   10/4/2024 10:20 AM Melinda Jaramillo MD [facility] [place]   11/6/2024 10:45 AM Tavo José MD [facility]   7/29/2025  8:30 AM [facility]

## 2024-09-28 NOTE — PROGRESS NOTES
Leonela Faria is a 68 year old female.   Chief Complaint   Patient presents with    Neck Pain     Pain in neck      HPI:   Patient with a slight worsening in her oropharyngeal dysphagia after trying to push something very heavy down she feels like she strained her neck.    Current Outpatient Medications   Medication Sig Dispense Refill    UNITHROID 50 MCG Oral Tab Take 1 tablet (50 mcg total) by mouth before breakfast. 90 tablet 0    sulfamethoxazole-trimethoprim -160 MG Oral Tab per tablet Take 1 tablet by mouth every 12 (twelve) hours. (Patient not taking: Reported on 7/26/2024) 14 tablet 0    traMADol 50 MG Oral Tab Take 1 tablet (50 mg total) by mouth every 6 (six) hours as needed for Pain. (Patient not taking: Reported on 9/6/2024) 15 tablet 0    cephalexin 500 MG Oral Cap Take 1 capsule (500 mg total) by mouth every 8 (eight) hours. (Patient not taking: Reported on 7/26/2024) 21 capsule 0    ondansetron (ZOFRAN) 4 mg tablet Take 1 tablet (4 mg total) by mouth every 8 (eight) hours as needed for Nausea. (Patient not taking: Reported on 7/26/2024) 6 tablet 0      Past Medical History:    Anxiety    Disorder of thyroid    History of stomach ulcers    IBS (irritable bowel syndrome)    Migraines    after surgery migraines    PONV (postoperative nausea and vomiting)      Social History:  Social History     Socioeconomic History    Marital status:    Tobacco Use    Smoking status: Never    Smokeless tobacco: Never   Vaping Use    Vaping status: Never Used   Substance and Sexual Activity    Alcohol use: Never    Drug use: Never     Social Determinants of Health     Food Insecurity: No Food Insecurity (6/17/2024)    Food Insecurity     Food Insecurity: Never true   Transportation Needs: No Transportation Needs (6/17/2024)    Transportation Needs     Lack of Transportation: No   Housing Stability: Low Risk  (6/17/2024)    Housing Stability     Housing Instability: No        REVIEW OF SYSTEMS:    GENERAL HEALTH: feels well otherwise  GENERAL : denies fever, chills, sweats, weight loss, weight gain  SKIN: denies any unusual skin lesions or rashes  RESPIRATORY: denies shortness of breath with exertion  NEURO: denies headaches    EXAM:   Ht 5' 7\" (1.702 m)   Wt 116 lb (52.6 kg)   BMI 18.17 kg/m²   System Details   Skin Inspection - Normal.   Constitutional Overall appearance - Normal.   Head/Face Facial features - Normal. Eyebrows - Normal. Skull - Normal.   Eyes Conjunctiva - Right: Normal, Left: Normal. Pupil - Right: Normal, Left: Normal.    Ears Inspection - Right: Normal, Left: Normal.   Canal - Right: Normal, Left: Normal.    TM - Right: Normal, Left: Normal.   Nasal External nose - Normal.   Nasal septum - Normal.  Turbinates - Normal   Oral/Oropharynx Lips - Normal, Tonsils - Normal, Tongue - Normal    Neck Exam Inspection - Normal. Palpation - Normal. Parotid gland - Normal. Thyroid gland - Normal.  Well-healed incision no erythema   Lymph Detail Submental. Submandibular. Anterior cervical. Posterior cervical. Supraclavicular.  All without enlargement   Psychiatric Orientation - Oriented to time, place, person & situation. Appropriate mood and affect.   Neurological Memory - Normal. Cranial nerves - Cranial nerves II through XII grossly intact.   Nasopharynx Normal by fiberoptic exam   Larynx Consent was obtained for the procedure.  The nose was asesthetized with 1% neosynephrine and 4% lidocaine topical drops.    The scope was placed into the bilateral nares as well as the nasopharynx, hypopharynx and larynx.    All of which were examined.  The  entire larynx including the vallecula, epiglottis, true and false vocal cords, aryepiglottic folds, piriform sinuses and post cricord area were examined for tumors and infectious processes as well as for evidence of reflux and retained secretions.  Vocal cord mobility was also assessed.    Any abnormalities are noted in the exam section.  Normal vocal cord  mobility.  No erythema.  No tumors or masses seen.  Mucoid postnasal drainage     ASSESSMENT AND PLAN:   1. Oropharyngeal dysphagia  Patient to advance diet as tolerated.  No evidence of infection or other abnormalities on the date of her visit.  No retained secretions.    2. Neck pain  Likely has muscle strain.  No evidence of infection.    3. Postnasal drip  Increase the fluids for her mucoid postnasal drainage.  Follow-up with any additional questions or problems.        The patient indicates understanding of these issues and agrees to the plan.      Melinda Jaramillo MD  9/27/2024  9:04 PM

## 2024-09-28 NOTE — PATIENT INSTRUCTIONS
There was no evidence of infection on the day to your visit.  I think it is most likely that the muscle is just strained.  Continue your supplements for an formation.  Increase the water for your mucoid postnasal drip.  Follow-up with any additional questions or problems.

## 2024-10-11 ENCOUNTER — OFFICE VISIT (OUTPATIENT)
Dept: OTOLARYNGOLOGY | Facility: CLINIC | Age: 69
End: 2024-10-11

## 2024-10-11 VITALS
WEIGHT: 117 LBS | RESPIRATION RATE: 16 BRPM | BODY MASS INDEX: 18.36 KG/M2 | OXYGEN SATURATION: 97 % | HEART RATE: 63 BPM | HEIGHT: 67 IN

## 2024-10-11 DIAGNOSIS — M54.2 NECK PAIN: ICD-10-CM

## 2024-10-11 DIAGNOSIS — R13.12 OROPHARYNGEAL DYSPHAGIA: Primary | ICD-10-CM

## 2024-10-11 PROCEDURE — 99213 OFFICE O/P EST LOW 20 MIN: CPT | Performed by: SPECIALIST

## 2024-10-12 NOTE — PATIENT INSTRUCTIONS
Your symptoms continue to improve slowly.  No evidence of infection or other problems at the surgical site.  Continue increasing your activity and oral intake as tolerated.  Follow-up in 2 months time, sooner if problems.

## 2024-12-10 ENCOUNTER — OFFICE VISIT (OUTPATIENT)
Dept: OTOLARYNGOLOGY | Facility: CLINIC | Age: 69
End: 2024-12-10

## 2024-12-10 VITALS — WEIGHT: 117 LBS | BODY MASS INDEX: 18 KG/M2

## 2024-12-10 DIAGNOSIS — R49.0 HOARSENESS: ICD-10-CM

## 2024-12-10 DIAGNOSIS — R21 RASH OF NECK: ICD-10-CM

## 2024-12-10 DIAGNOSIS — R13.12 OROPHARYNGEAL DYSPHAGIA: Primary | ICD-10-CM

## 2024-12-10 PROCEDURE — 99214 OFFICE O/P EST MOD 30 MIN: CPT | Performed by: SPECIALIST

## 2024-12-11 NOTE — PROGRESS NOTES
Leonela Faria is a 69 year old female.   Chief Complaint   Patient presents with    Follow - Up     Patient is here for 2 months follow up of neck pain      HPI:   Patient here for follow-up in 2 months.  He has been trying to advance her diet.  Used a hair product and has a rash on her neck.    Current Outpatient Medications   Medication Sig Dispense Refill    UNITHROID 50 MCG Oral Tab Take 1 tablet (50 mcg total) by mouth before breakfast. 90 tablet 0    sulfamethoxazole-trimethoprim -160 MG Oral Tab per tablet Take 1 tablet by mouth every 12 (twelve) hours. (Patient not taking: Reported on 12/10/2024) 14 tablet 0    traMADol 50 MG Oral Tab Take 1 tablet (50 mg total) by mouth every 6 (six) hours as needed for Pain. (Patient not taking: Reported on 9/6/2024) 15 tablet 0    cephalexin 500 MG Oral Cap Take 1 capsule (500 mg total) by mouth every 8 (eight) hours. (Patient not taking: Reported on 7/26/2024) 21 capsule 0    ondansetron (ZOFRAN) 4 mg tablet Take 1 tablet (4 mg total) by mouth every 8 (eight) hours as needed for Nausea. (Patient not taking: Reported on 7/26/2024) 6 tablet 0      Past Medical History:    Anxiety    Disorder of thyroid    History of stomach ulcers    IBS (irritable bowel syndrome)    Migraines    after surgery migraines    PONV (postoperative nausea and vomiting)      Social History:  Social History     Socioeconomic History    Marital status:    Tobacco Use    Smoking status: Never    Smokeless tobacco: Never   Vaping Use    Vaping status: Never Used   Substance and Sexual Activity    Alcohol use: Never    Drug use: Never     Social Drivers of Health     Food Insecurity: No Food Insecurity (6/17/2024)    Food Insecurity     Food Insecurity: Never true   Transportation Needs: No Transportation Needs (6/17/2024)    Transportation Needs     Lack of Transportation: No   Housing Stability: Low Risk  (6/17/2024)    Housing Stability     Housing Instability: No        REVIEW  OF SYSTEMS:   GENERAL HEALTH: feels well otherwise  GENERAL : denies fever, chills, sweats, weight loss, weight gain  SKIN: denies any unusual skin lesions or rashes  RESPIRATORY: denies shortness of breath with exertion  NEURO: denies headaches    EXAM:   Wt 117 lb (53.1 kg)   BMI 18.32 kg/m²   System Details   Skin Inspection - Normal.  Rash on the posterior neck just below the scalp.   Constitutional Overall appearance - Normal.   Head/Face Facial features - Normal. Eyebrows - Normal. Skull - Normal.   Eyes Conjunctiva - Right: Normal, Left: Normal. Pupil - Right: Normal, Left: Normal.    Ears Inspection - Right: Normal, Left: Normal.   Canal - Right: Normal, Left: Normal.   TM - Right: Normal, Left: Normal.   Nasal External nose - Normal.   Nasal septum - Normal.  Turbinates - Normal.   Oral/Oropharynx Lips - Normal, Tonsils - Normal, Tongue - Normal    Neck Exam Inspection - Normal. Palpation - Normal. Parotid gland - Normal. Thyroid gland - Normal..  Well-healed incision.  No erythema.   Lymph Detail Submental. Submandibular. Anterior cervical. Posterior cervical. Supraclavicular all without enlargement   Larynx Normal vocal cord mobility.  No retained secretions.  No tumors or masses seen.   Psychiatric Orientation - Oriented to time, place, person & situation. Appropriate mood and affect.   Neurological Memory - Normal. Cranial nerves - Cranial nerves II through XII grossly intact.     ASSESSMENT AND PLAN:   1. Oropharyngeal dysphagia  Continues to improve.  Patient now able to eat most of the steak without difficulty.    2. Hoarseness  Patient now states is intermittent and voice is normal on the time of her visit    3. Rash of neck  Pack was secondary to the hair product.  Patient can try Zyrtec one half or 1 pill.  Follow-up in 2 months time, sooner if problems.      The patient indicates understanding of these issues and agrees to the plan.      Melinda Jaramillo MD  12/10/2024  7:03 PM

## 2024-12-11 NOTE — PATIENT INSTRUCTIONS
Can take Zyrtec one half or 1 pill for the rash on your neck.  Continue to advance your diet as tolerated.  Follow-up in 2 months time, sooner if problems.

## 2025-02-18 ENCOUNTER — OFFICE VISIT (OUTPATIENT)
Dept: OTOLARYNGOLOGY | Facility: CLINIC | Age: 70
End: 2025-02-18

## 2025-02-18 VITALS — BODY MASS INDEX: 18.36 KG/M2 | HEIGHT: 67 IN | WEIGHT: 117 LBS

## 2025-02-18 DIAGNOSIS — R21 RASH OF NECK: ICD-10-CM

## 2025-02-18 DIAGNOSIS — R13.12 OROPHARYNGEAL DYSPHAGIA: Primary | ICD-10-CM

## 2025-02-18 DIAGNOSIS — R53.83 LETHARGY: ICD-10-CM

## 2025-02-18 PROCEDURE — 99214 OFFICE O/P EST MOD 30 MIN: CPT | Performed by: SPECIALIST

## 2025-02-18 NOTE — PROGRESS NOTES
Leonela Faria is a 69 year old female.   Chief Complaint   Patient presents with    Follow - Up     oropharyngeal dysphagia     HPI:   Patient here for follow-up.  Still has some food that feels like it gets stuck.  Still feels tired intermittently.    Current Outpatient Medications   Medication Sig Dispense Refill    Fluocinolone Acetonide Scalp (DERMA-SMOOTHE/FS SCALP) 0.01 % External Oil Apply 1 Application topically every evening. Apply to scalp before bed, rinse off in the morning 120 mL 2    UNITHROID 50 MCG Oral Tab Take 1 tablet (50 mcg total) by mouth before breakfast. 90 tablet 0    sulfamethoxazole-trimethoprim -160 MG Oral Tab per tablet Take 1 tablet by mouth every 12 (twelve) hours. (Patient not taking: Reported on 7/26/2024) 14 tablet 0    traMADol 50 MG Oral Tab Take 1 tablet (50 mg total) by mouth every 6 (six) hours as needed for Pain. (Patient not taking: Reported on 2/18/2025) 15 tablet 0    cephalexin 500 MG Oral Cap Take 1 capsule (500 mg total) by mouth every 8 (eight) hours. (Patient not taking: Reported on 2/18/2025) 21 capsule 0    ondansetron (ZOFRAN) 4 mg tablet Take 1 tablet (4 mg total) by mouth every 8 (eight) hours as needed for Nausea. (Patient not taking: Reported on 2/18/2025) 6 tablet 0      Past Medical History:    Anxiety    Disorder of thyroid    History of stomach ulcers    IBS (irritable bowel syndrome)    Migraines    after surgery migraines    PONV (postoperative nausea and vomiting)      Social History:  Social History     Socioeconomic History    Marital status:    Tobacco Use    Smoking status: Never    Smokeless tobacco: Never   Vaping Use    Vaping status: Never Used   Substance and Sexual Activity    Alcohol use: Never    Drug use: Never     Social Drivers of Health     Food Insecurity: No Food Insecurity (6/17/2024)    Food Insecurity     Food Insecurity: Never true   Transportation Needs: No Transportation Needs (6/17/2024)    Transportation Needs      Lack of Transportation: No   Housing Stability: Low Risk  (6/17/2024)    Housing Stability     Housing Instability: No        REVIEW OF SYSTEMS:   GENERAL HEALTH: feels well otherwise  GENERAL : denies fever, chills, sweats, weight loss, weight gain  SKIN: denies any unusual skin lesions or rashes  RESPIRATORY: denies shortness of breath with exertion  NEURO: denies headaches    EXAM:   Ht 5' 7\" (1.702 m)   Wt 117 lb (53.1 kg)   BMI 18.32 kg/m²   System Details   Skin Inspection - Normal.  Still some rash in the posterior scalp area.   Constitutional Overall appearance - Normal.   Head/Face Facial features - Normal. Eyebrows - Normal. Skull - Normal.   Eyes Conjunctiva - Right: Normal, Left: Normal. Pupil - Right: Normal, Left: Normal.    Ears Inspection - Right: Normal, Left: Normal.   Canal - Right: Normal, Left: Normal.   TM - Right: Normal, Left: Normal.   Nasal External nose - Normal.   Nasal septum - Normal.  Turbinates - Normal.   Oral/Oropharynx Lips - Normal, Tonsils - Normal, Tongue - Normal    Neck Exam Inspection - Normal. Palpation - Normal. Parotid gland - Normal. Thyroid gland - Normal.  Well-healed scar no evidence of infection or recurrence.  No palpable thyroid nodules.   Lymph Detail Submental. Submandibular. Anterior cervical. Posterior cervical. Supraclavicular all without enlargement   Psychiatric Orientation - Oriented to time, place, person & situation. Appropriate mood and affect.   Larynx Mirror examination.  No retained secretions.   Neurological Memory - Normal. Cranial nerves - Cranial nerves II through XII grossly intact.     ASSESSMENT AND PLAN:   1. Oropharyngeal dysphagia  Patient still with some difficulty eating certain foods including steak.  She does however continue to gain weight although slowly.  Continue trying to eat what ever is comfortable for her.    2. Rash of neck  Patient seeing the dermatologist.  I defer the treatment of this to him.    3. Lethargy  Thyroid  ultrasound and although there are some nodules none require biopsy.  Thyroid blood work within normal range.  Follow-up in 3 months time, sooner if problems.      The patient indicates understanding of these issues and agrees to the plan.      Melinda Jaramillo MD  2/18/2025  4:34 PM

## 2025-02-18 NOTE — PATIENT INSTRUCTIONS
You continue to improve on your overall health however slowly.  Continue nutritional efforts and follow-up in 3 months time.  Reviewed thyroid ultrasound and repeat in 1 years time.  Thyroid blood work within normal limits.  Please follow-up with your dermatologist concerning your scalp rash.

## 2025-04-14 ENCOUNTER — OFFICE VISIT (OUTPATIENT)
Dept: OBGYN | Age: 70
End: 2025-04-14

## 2025-04-14 ENCOUNTER — TELEPHONE (OUTPATIENT)
Dept: OBGYN | Age: 70
End: 2025-04-14

## 2025-04-14 VITALS
HEIGHT: 68 IN | OXYGEN SATURATION: 98 % | TEMPERATURE: 98 F | BODY MASS INDEX: 17.99 KG/M2 | SYSTOLIC BLOOD PRESSURE: 114 MMHG | WEIGHT: 118.7 LBS | HEART RATE: 70 BPM | DIASTOLIC BLOOD PRESSURE: 72 MMHG

## 2025-04-14 DIAGNOSIS — N90.89 VULVAR LESION: ICD-10-CM

## 2025-04-14 DIAGNOSIS — N85.8 ATROPHIC ENDOMETRIUM: ICD-10-CM

## 2025-04-14 DIAGNOSIS — N95.0 POSTMENOPAUSAL BLEEDING: Primary | ICD-10-CM

## 2025-04-15 ENCOUNTER — APPOINTMENT (OUTPATIENT)
Dept: OBGYN | Age: 70
End: 2025-04-15

## 2025-05-06 ENCOUNTER — TELEPHONE (OUTPATIENT)
Dept: OTOLARYNGOLOGY | Facility: CLINIC | Age: 70
End: 2025-05-06

## 2025-05-06 NOTE — TELEPHONE ENCOUNTER
Dr. Jaramillo, patient is requesting a note excusing her from jury duty due to history low blood sugar, strict guidelines regarding no outside food brought in,  Needs to eat often gluten free food, feels fatigue and drained since surgery,  has jury duty starting Monday, May 19, 2025.  Can this be written please by us?

## 2025-05-06 NOTE — TELEPHONE ENCOUNTER
Per omar Thompson for note to excuse for jury duty.  Letter generated and contacted patient and  informed this letter is available in [employer]. Patient may  if unable to print from [facility] location, given location hours.

## 2025-05-06 NOTE — TELEPHONE ENCOUNTER
Pt called.  Received a summons for jury duty.  Pt requesting a note to be excused.  Pt had surgery 2024. Drained since surgery.  Pt eat gluten free.  Unable to bring food into the court house.  Call when ready for  in [facility].

## 2025-05-14 ENCOUNTER — APPOINTMENT (OUTPATIENT)
Dept: ULTRASOUND IMAGING | Age: 70
End: 2025-05-14
Attending: OBSTETRICS & GYNECOLOGY

## 2025-05-15 ENCOUNTER — RESULTS FOLLOW-UP (OUTPATIENT)
Dept: OBGYN | Age: 70
End: 2025-05-15

## 2025-05-16 ENCOUNTER — TELEPHONE (OUTPATIENT)
Dept: OBGYN | Age: 70
End: 2025-05-16

## 2025-05-16 DIAGNOSIS — N83.201 RIGHT OVARIAN CYST: Primary | ICD-10-CM

## 2025-05-20 ENCOUNTER — OFFICE VISIT (OUTPATIENT)
Dept: OTOLARYNGOLOGY | Facility: CLINIC | Age: 70
End: 2025-05-20

## 2025-05-20 VITALS — HEIGHT: 67 IN | BODY MASS INDEX: 18.83 KG/M2 | WEIGHT: 120 LBS

## 2025-05-20 DIAGNOSIS — E04.2 MULTIPLE THYROID NODULES: ICD-10-CM

## 2025-05-20 DIAGNOSIS — R49.0 HOARSENESS: ICD-10-CM

## 2025-05-20 DIAGNOSIS — R13.12 OROPHARYNGEAL DYSPHAGIA: ICD-10-CM

## 2025-05-20 DIAGNOSIS — H91.91 HEARING REDUCED, RIGHT: Primary | ICD-10-CM

## 2025-05-20 NOTE — PATIENT INSTRUCTIONS
Please alternate food and liquids for your swallowing.  On positive note, is that there has been a 3 pound weight gain which is encouraging.  An audiogram was ordered to better evaluate your intermittent decreased hearing in your right ear.  I will of course notify you of this result.  Follow-up in 3 months time, sooner if problems.

## 2025-05-20 NOTE — PROGRESS NOTES
Leonela Faria is a 69 year old female.   Chief Complaint   Patient presents with    Follow - Up     oropharyngeal dysphagia     HPI:   Patient here to discuss her swallowing as well as intermittent hoarseness.  She also reports intermittent decreased hearing in her right ear.    Current Medications[1]   Past Medical History[2]   Social History:  Short Social Hx on File[3]     REVIEW OF SYSTEMS:   GENERAL HEALTH: feels well otherwise  GENERAL : denies fever, chills, sweats, weight loss, weight gain  SKIN: denies any unusual skin lesions or rashes  RESPIRATORY: denies shortness of breath with exertion  NEURO: denies headaches    EXAM:   Ht 5' 7\" (1.702 m)   Wt 120 lb (54.4 kg)   BMI 18.79 kg/m²   System Details   Skin Inspection - Normal.   Constitutional Overall appearance - Normal.   Head/Face Facial features - Normal. Eyebrows - Normal. Skull - Normal.   Eyes Conjunctiva - Right: Normal, Left: Normal. Pupil - Right: Normal, Left: Normal.    Ears Inspection - Right: Normal, Left: Normal.   Canal - Right: Normal, Left: Normal.    TM - Right: Normal, Left: Normal.  No middle ear fluid either ear  Tuning fork 512 Hz right equals left   Nasal External nose - Normal.   Nasal septum - Normal.  Turbinates - Normal   Oral/Oropharynx Lips - Normal, Tonsils - Normal, Tongue - Normal    Neck Exam Inspection - Normal. Palpation - Normal. Parotid gland - Normal. Thyroid gland -no palpable thyroid nodules.  Well-healed surgical scar.   Lymph Detail Submental. Submandibular. Anterior cervical. Posterior cervical. Supraclavicular.  All without enlargement   Psychiatric Orientation - Oriented to time, place, person & situation. Appropriate mood and affect.   Neurological Memory - Normal. Cranial nerves - Cranial nerves II through XII grossly intact.   Nasopharynx Normal by fiberoptic exam   Larynx Consent was obtained for the procedure.  The nose was asesthetized with 1% neosynephrine and 4% lidocaine topical drops.    The  scope was placed into the bilateral nares as well as the nasopharynx, hypopharynx and larynx.    All of which were examined.  The  entire larynx including the vallecula, epiglottis, true and false vocal cords, aryepiglottic folds, piriform sinuses and post cricord area were examined for tumors and infectious processes as well as for evidence of reflux and retained secretions.  Vocal cord mobility was also assessed.    Any abnormalities are noted in the exam section.  No retained secretions.  Slight posterior laryngeal erythema.  No tumors or masses seen.  No vocal cord nodules.  Normal vocal cord mobility.     ASSESSMENT AND PLAN:   1. Hearing reduced, right  Will get audiogram to better assess.  - Audiology Referral - [place] ([facility])    2. Oropharyngeal dysphagia  Patient to alternate food and liquid.  She states that her eating is getting closer to normal.  She still has some difficulty eating steak and salad.  There has been a 3 pound weight gain.    3. Hoarseness  Slight posterior laryngeal erythema.  Patient denies reflux symptoms.    4. Multiple thyroid nodules  Patient being followed by Dr. Rizzo.  Follow-up in 3 months time, sooner if problems.      The patient indicates understanding of these issues and agrees to the plan.      Melinda Jaramillo MD  5/20/2025  4:21 PM       [1]   Current Outpatient Medications   Medication Sig Dispense Refill    Fluocinolone Acetonide Scalp (DERMA-SMOOTHE/FS SCALP) 0.01 % External Oil Apply 1 Application topically every evening. Apply to scalp before bed, rinse off in the morning 120 mL 2    UNITHROID 50 MCG Oral Tab Take 1 tablet (50 mcg total) by mouth before breakfast. 90 tablet 0    sulfamethoxazole-trimethoprim -160 MG Oral Tab per tablet Take 1 tablet by mouth every 12 (twelve) hours. (Patient not taking: Reported on 5/20/2025) 14 tablet 0    traMADol 50 MG Oral Tab Take 1 tablet (50 mg total) by mouth every 6 (six) hours as needed for Pain.  (Patient not taking: Reported on 5/20/2025) 15 tablet 0    cephalexin 500 MG Oral Cap Take 1 capsule (500 mg total) by mouth every 8 (eight) hours. (Patient not taking: Reported on 5/20/2025) 21 capsule 0    ondansetron (ZOFRAN) 4 mg tablet Take 1 tablet (4 mg total) by mouth every 8 (eight) hours as needed for Nausea. (Patient not taking: Reported on 5/20/2025) 6 tablet 0   [2]   Past Medical History:   Anxiety    Disorder of thyroid    History of stomach ulcers    IBS (irritable bowel syndrome)    Migraines    after surgery migraines    PONV (postoperative nausea and vomiting)   [3]   Social History  Socioeconomic History    Marital status:    Tobacco Use    Smoking status: Never    Smokeless tobacco: Never   Vaping Use    Vaping status: Never Used   Substance and Sexual Activity    Alcohol use: Never    Drug use: Never     Social Drivers of Health     Food Insecurity: No Food Insecurity (6/17/2024)    Food Insecurity     Food Insecurity: Never true   Transportation Needs: No Transportation Needs (6/17/2024)    Transportation Needs     Lack of Transportation: No   Housing Stability: Low Risk  (6/17/2024)    Housing Stability     Housing Instability: No

## 2025-05-21 ENCOUNTER — OFFICE VISIT (OUTPATIENT)
Dept: AUDIOLOGY | Facility: CLINIC | Age: 70
End: 2025-05-21

## 2025-05-21 ENCOUNTER — PATIENT MESSAGE (OUTPATIENT)
Dept: OTOLARYNGOLOGY | Facility: CLINIC | Age: 70
End: 2025-05-21

## 2025-05-21 DIAGNOSIS — H90.3 SENSORINEURAL HEARING LOSS, BILATERAL: Primary | ICD-10-CM

## 2025-05-21 PROCEDURE — 92567 TYMPANOMETRY: CPT | Performed by: AUDIOLOGIST

## 2025-05-21 PROCEDURE — 92557 COMPREHENSIVE HEARING TEST: CPT | Performed by: AUDIOLOGIST

## 2025-05-23 NOTE — TELEPHONE ENCOUNTER
Per patient does not use [employer] would like call back with hearing test results. Please call thank  you.

## 2025-05-27 NOTE — TELEPHONE ENCOUNTER
Called Leonela and I went over Dr. Jaramillo's messsage about hearing results and her recommendations. Leonela will think about the hearing aids and call us if she wants to move forward with them or just wait until next year.

## 2025-06-19 ENCOUNTER — TELEPHONE (OUTPATIENT)
Dept: OBGYN | Age: 70
End: 2025-06-19

## 2025-06-19 ENCOUNTER — TELEPHONE (OUTPATIENT)
Dept: OTOLARYNGOLOGY | Facility: CLINIC | Age: 70
End: 2025-06-19

## 2025-06-19 NOTE — TELEPHONE ENCOUNTER
Leonela Thompson said her physician has recommended she get physical therapy for her neck, but she wants to make sure you are ok with that due to her prior Thyroglossal duct surgery.

## 2025-06-19 NOTE — TELEPHONE ENCOUNTER
Per patient had surgery for thyroglossal duct and another physician recommended physical therapy for her neck and asking if Dr. Jaramillo would be ok with it. Please advise

## 2025-06-19 NOTE — TELEPHONE ENCOUNTER
Dr. Jaramillo, I informed Leonela that you thought PT would be ok. Her neurologist really thinks she should get back into the PT because of her headaches, but she's a little concerned because he will really push hard on her chin/jaw to release tension, he will pull up her entire neck area pretty strongly and move her head/neck side to side.  She doesn't want to injure anything so wants to make sure that's ok.

## 2025-06-20 NOTE — TELEPHONE ENCOUNTER
Left a voicemail for Leonela, in regards to the physical therapy, Dr Jaramillo said that she will have to explain to the PT provider what her limitations are.

## 2025-06-26 ENCOUNTER — TELEPHONE (OUTPATIENT)
Dept: OTOLARYNGOLOGY | Facility: CLINIC | Age: 70
End: 2025-06-26

## 2025-07-22 ENCOUNTER — OFFICE VISIT (OUTPATIENT)
Dept: OTOLARYNGOLOGY | Facility: CLINIC | Age: 70
End: 2025-07-22

## 2025-07-22 VITALS — BODY MASS INDEX: 18.83 KG/M2 | WEIGHT: 120 LBS | HEIGHT: 67 IN

## 2025-07-22 DIAGNOSIS — R13.12 OROPHARYNGEAL DYSPHAGIA: Primary | ICD-10-CM

## 2025-07-22 DIAGNOSIS — H92.11 OTORRHEA OF RIGHT EAR: ICD-10-CM

## 2025-07-22 PROCEDURE — 99213 OFFICE O/P EST LOW 20 MIN: CPT | Performed by: SPECIALIST

## 2025-07-22 NOTE — PROGRESS NOTES
Leonela Faria is a 69 year old female.   Chief Complaint   Patient presents with    Follow - Up     hearing reduced, right and oropharyngeal dysphagia     HPI:   Patient here still has some difficulty eating crunchy foods like salads.  She is reporting some otorrhea in the right ear.    Current Medications[1]   Past Medical History[2]   Social History:  Short Social Hx on File[3]     REVIEW OF SYSTEMS:   GENERAL HEALTH: feels well otherwise  GENERAL : denies fever, chills, sweats, weight loss, weight gain  SKIN: denies any unusual skin lesions or rashes  RESPIRATORY: denies shortness of breath with exertion  NEURO: denies headaches    EXAM:   Ht 5' 7\" (1.702 m)   Wt 120 lb (54.4 kg)   BMI 18.79 kg/m²   System Details   Skin Inspection - Normal.   Constitutional Overall appearance - Normal.   Head/Face Facial features - Normal. Eyebrows - Normal. Skull - Normal.   Eyes Conjunctiva - Right: Normal, Left: Normal. Pupil - Right: Normal, Left: Normal.    Ears Inspection - Right: Normal, Left: Normal.   Canal -nonocclusive cerumen cleaned bilaterally.  Some odor from the right ear canal.  TM - Right: Normal, Left: Normal.   Nasal External nose - Normal.   Nasal septum - Normal.  Turbinates - Normal   Oral/Oropharynx Lips - Normal, Tonsils - Normal, Tongue - Normal    Neck Exam Inspection - Normal. Palpation - Normal. Parotid gland - Normal. Thyroid gland - Normal.   Lymph Detail Submental. Submandibular. Anterior cervical. Posterior cervical. Supraclavicular.  All without enlargement   Psychiatric Orientation - Oriented to time, place, person & situation. Appropriate mood and affect.   Neurological Memory - Normal. Cranial nerves - Cranial nerves II through XII grossly intact.   Larynx Mirror examination.  Able to visualize posterior larynx.  Normal vocal cord mobility and no retained secretions.     ASSESSMENT AND PLAN:   1. Oropharyngeal dysphagia  Patient continues to improve and advance her diet as  tolerated.    2. Otorrhea of right ear  Patient to try equal parts of white vinegar and rubbing alcohol for 1 week's time and then on an as needed basis.  Patient already had an appointment in August and will follow-up at that point in time.      The patient indicates understanding of these issues and agrees to the plan.      Melinda Jaramillo MD  7/22/2025  4:00 PM       [1]   Current Outpatient Medications   Medication Sig Dispense Refill    Fluocinolone Acetonide Scalp (DERMA-SMOOTHE/FS SCALP) 0.01 % External Oil Apply 1 Application topically every evening. Apply to scalp before bed, rinse off in the morning 120 mL 2    UNITHROID 50 MCG Oral Tab Take 1 tablet (50 mcg total) by mouth before breakfast. 90 tablet 0    sulfamethoxazole-trimethoprim -160 MG Oral Tab per tablet Take 1 tablet by mouth every 12 (twelve) hours. (Patient not taking: Reported on 7/22/2025) 14 tablet 0    traMADol 50 MG Oral Tab Take 1 tablet (50 mg total) by mouth every 6 (six) hours as needed for Pain. (Patient not taking: Reported on 7/22/2025) 15 tablet 0    cephalexin 500 MG Oral Cap Take 1 capsule (500 mg total) by mouth every 8 (eight) hours. (Patient not taking: Reported on 7/22/2025) 21 capsule 0    ondansetron (ZOFRAN) 4 mg tablet Take 1 tablet (4 mg total) by mouth every 8 (eight) hours as needed for Nausea. (Patient not taking: Reported on 7/22/2025) 6 tablet 0   [2]   Past Medical History:   Anxiety    Disorder of thyroid    History of stomach ulcers    IBS (irritable bowel syndrome)    Migraines    after surgery migraines    PONV (postoperative nausea and vomiting)   [3]   Social History  Socioeconomic History    Marital status:    Tobacco Use    Smoking status: Never    Smokeless tobacco: Never   Vaping Use    Vaping status: Never Used   Substance and Sexual Activity    Alcohol use: Never    Drug use: Never     Social Drivers of Health     Food Insecurity: No Food Insecurity (6/17/2024)    Food Insecurity     Food  Insecurity: Never true   Transportation Needs: No Transportation Needs (6/17/2024)    Transportation Needs     Lack of Transportation: No   Housing Stability: Low Risk  (6/17/2024)    Housing Stability     Housing Instability: No

## 2025-07-22 NOTE — PATIENT INSTRUCTIONS
As there is a malodor from the right ear, I asked her to use equal parts of white vinegar and rubbing alcohol for a week and then as needed.  Follow-up next month as previously planned.  Continue to advance your diet as tolerated.

## 2025-08-12 ENCOUNTER — APPOINTMENT (OUTPATIENT)
Dept: ULTRASOUND IMAGING | Age: 70
End: 2025-08-12
Attending: OBSTETRICS & GYNECOLOGY

## 2025-08-12 DIAGNOSIS — N83.201 RIGHT OVARIAN CYST: ICD-10-CM

## 2025-08-12 PROCEDURE — 76830 TRANSVAGINAL US NON-OB: CPT

## 2025-08-15 ENCOUNTER — RESULTS FOLLOW-UP (OUTPATIENT)
Dept: OBGYN | Age: 70
End: 2025-08-15

## 2025-08-15 ENCOUNTER — TELEPHONE (OUTPATIENT)
Dept: OBGYN | Age: 70
End: 2025-08-15

## 2025-08-15 DIAGNOSIS — N83.299 COMPLEX OVARIAN CYST: ICD-10-CM

## 2025-08-15 DIAGNOSIS — N83.201 RIGHT OVARIAN CYST: Primary | ICD-10-CM

## 2025-08-26 ENCOUNTER — OFFICE VISIT (OUTPATIENT)
Dept: OTOLARYNGOLOGY | Facility: CLINIC | Age: 70
End: 2025-08-26

## 2025-08-26 VITALS — WEIGHT: 121.81 LBS | BODY MASS INDEX: 19.12 KG/M2 | HEIGHT: 67 IN

## 2025-08-26 DIAGNOSIS — R13.12 OROPHARYNGEAL DYSPHAGIA: ICD-10-CM

## 2025-08-26 DIAGNOSIS — J06.9 VIRAL UPPER RESPIRATORY TRACT INFECTION: Primary | ICD-10-CM

## 2025-08-26 DIAGNOSIS — H92.11 OTORRHEA OF RIGHT EAR: ICD-10-CM

## 2025-08-26 PROCEDURE — 99213 OFFICE O/P EST LOW 20 MIN: CPT | Performed by: SPECIALIST

## (undated) DEVICE — SUCTION CANISTER, 3000CC,SAFELINER: Brand: DEROYAL

## (undated) DEVICE — SPONGE GZ 4X4IN COT 12 PLY TYP VII WVN C

## (undated) DEVICE — NECK ACCESSORY: Brand: MEDLINE INDUSTRIES, INC.

## (undated) DEVICE — GLOVE SUR 6.5 SENSICARE NEOPR PWD F

## (undated) DEVICE — SUT PROL 4-0 18IN N ABSRB BLU L19MM FS-2

## (undated) DEVICE — ELECTRODE ES RET 2 PATE W/ 10FT CRD MPLR DISP

## (undated) DEVICE — SUT PERMA- 3-0 18IN NABSRB BLK TIE

## (undated) DEVICE — SUT PERMA- 2-0 18IN NABSRB BLK TIE SILK

## (undated) DEVICE — SOLUTION IRRIG 1000ML 0.9% NACL USP BTL

## (undated) DEVICE — PACK CDS HEAD

## (undated) NOTE — LETTER
5/6/2025              Leonela Faria        [address] [address]         To whom it may concern,    Leonela Faria is currently a patient under my medical care.  The patient's medical condition makes serving on jury duty inadvisable at this time. Patient was contacted regarding jury duty starting on Monday, May 19th, 2025. Please excuse them from serving.  If you require additional information please do not hesitate to contact my office.        Sincerely,    Melinda Jaramillo MD  [address] [address] [phone]